# Patient Record
Sex: MALE | Race: BLACK OR AFRICAN AMERICAN | NOT HISPANIC OR LATINO | Employment: STUDENT | URBAN - METROPOLITAN AREA
[De-identification: names, ages, dates, MRNs, and addresses within clinical notes are randomized per-mention and may not be internally consistent; named-entity substitution may affect disease eponyms.]

---
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
## 2021-03-12 ENCOUNTER — TELEPHONE (OUTPATIENT)
Dept: PEDIATRIC CARDIOLOGY | Facility: CLINIC | Age: 21
End: 2021-03-12

## 2021-03-15 ENCOUNTER — HOSPITAL ENCOUNTER (OUTPATIENT)
Dept: PEDIATRIC CARDIOLOGY | Facility: HOSPITAL | Age: 21
Discharge: HOME | End: 2021-03-15
Attending: PEDIATRICS
Payer: COMMERCIAL

## 2021-03-15 DIAGNOSIS — I47.20 VT (VENTRICULAR TACHYCARDIA): ICD-10-CM

## 2021-03-15 DIAGNOSIS — Q21.3 TETRALOGY OF FALLOT: ICD-10-CM

## 2021-03-15 LAB
AV DELAY - LONGEST: 320 MSEC
AV DELAY - SHORTEST: 110 MSEC
CHARGE TIME (SEC): 11 SEC
HV IMPEDANCE (OHM): 60 OHM
IMPEDANCE RA LEAD (NATIVE): 355 OHMS
IMPEDANCE RA LEAD: 830 OHMS
OHS CV DC PP MS1: 0.4 MS
OHS CV DC PP MS2: 0.4 MS
OHS CV DC PP V1: 2 V
OHS CV DC PP V2: 3.5 V
P/R-WAVE RA LEAD (NATIVE): 11.6 MV
P/R-WAVE RA LEAD: 4.7 MV
PV DELAY - LONGEST: 320 MSEC
PV DELAY - SHORTEST: 110 MSEC

## 2021-03-15 PROCEDURE — 93295 CV ICD REMOTE PEDIATRICS (CUPID ONLY): ICD-10-PCS | Mod: ,,, | Performed by: PEDIATRICS

## 2021-03-15 PROCEDURE — 93295 DEV INTERROG REMOTE 1/2/MLT: CPT | Mod: ,,, | Performed by: PEDIATRICS

## 2021-03-15 PROCEDURE — 93296 REM INTERROG EVL PM/IDS: CPT

## 2021-05-24 DIAGNOSIS — I45.10 COMPLETE RIGHT BUNDLE BRANCH BLOCK (RBBB): ICD-10-CM

## 2021-05-24 DIAGNOSIS — I47.20 VT (VENTRICULAR TACHYCARDIA): ICD-10-CM

## 2021-05-24 DIAGNOSIS — I49.3 PVCS (PREMATURE VENTRICULAR CONTRACTIONS): ICD-10-CM

## 2021-05-24 DIAGNOSIS — Z95.810 ICD (IMPLANTABLE CARDIOVERTER-DEFIBRILLATOR) IN PLACE: ICD-10-CM

## 2021-05-24 RX ORDER — METOPROLOL SUCCINATE 50 MG/1
50 TABLET, EXTENDED RELEASE ORAL 2 TIMES DAILY
Qty: 60 TABLET | Refills: 12 | Status: SHIPPED | OUTPATIENT
Start: 2021-05-24 | End: 2022-07-18

## 2021-06-21 ENCOUNTER — HOSPITAL ENCOUNTER (OUTPATIENT)
Dept: PEDIATRIC CARDIOLOGY | Facility: HOSPITAL | Age: 21
Discharge: HOME | End: 2021-06-21
Attending: PEDIATRICS

## 2021-06-21 DIAGNOSIS — Q21.3 TETRALOGY OF FALLOT: ICD-10-CM

## 2021-06-21 DIAGNOSIS — I47.20 VT (VENTRICULAR TACHYCARDIA): ICD-10-CM

## 2021-06-21 PROCEDURE — 93295 DEV INTERROG REMOTE 1/2/MLT: CPT | Mod: ,,, | Performed by: PEDIATRICS

## 2021-06-21 PROCEDURE — 93295 CV ICD REMOTE PEDIATRICS (CUPID ONLY): ICD-10-PCS | Mod: ,,, | Performed by: PEDIATRICS

## 2021-06-21 PROCEDURE — 93296 REM INTERROG EVL PM/IDS: CPT

## 2021-06-23 LAB
AV DELAY - LONGEST: 320 MSEC
AV DELAY - SHORTEST: 110 MSEC
CHARGE TIME (SEC): 10.8 SEC
HV IMPEDANCE (OHM): 62 OHM
IMPEDANCE RA LEAD (NATIVE): 351 OHMS
IMPEDANCE RA LEAD: 776 OHMS
OHS CV DC PP MS1: 0.4 MS
OHS CV DC PP MS2: 0.4 MS
OHS CV DC PP V1: 2 V
OHS CV DC PP V2: 3.5 V
P/R-WAVE RA LEAD (NATIVE): 12.6 MV
P/R-WAVE RA LEAD: 4.9 MV
PV DELAY - LONGEST: 320 MSEC
PV DELAY - SHORTEST: 110 MSEC

## 2021-09-07 ENCOUNTER — HOSPITAL ENCOUNTER (OUTPATIENT)
Dept: PEDIATRIC CARDIOLOGY | Facility: HOSPITAL | Age: 21
Discharge: HOME | End: 2021-09-07
Attending: PEDIATRICS

## 2021-09-07 DIAGNOSIS — I47.20 VT (VENTRICULAR TACHYCARDIA): ICD-10-CM

## 2021-09-07 DIAGNOSIS — Q21.3 TETRALOGY OF FALLOT: ICD-10-CM

## 2021-09-07 PROCEDURE — 93295 CV ICD REMOTE PEDIATRICS (CUPID ONLY): ICD-10-PCS | Mod: ,,, | Performed by: PEDIATRICS

## 2021-09-07 PROCEDURE — 93296 REM INTERROG EVL PM/IDS: CPT

## 2021-09-07 PROCEDURE — 93295 DEV INTERROG REMOTE 1/2/MLT: CPT | Mod: ,,, | Performed by: PEDIATRICS

## 2021-09-08 DIAGNOSIS — I49.3 PVC'S (PREMATURE VENTRICULAR CONTRACTIONS): ICD-10-CM

## 2021-09-08 DIAGNOSIS — I47.20 VT (VENTRICULAR TACHYCARDIA): Primary | ICD-10-CM

## 2021-09-08 DIAGNOSIS — Z95.810 ICD (IMPLANTABLE CARDIOVERTER-DEFIBRILLATOR), DUAL, IN SITU: ICD-10-CM

## 2021-09-08 DIAGNOSIS — Q21.3 TETRALOGY OF FALLOT: ICD-10-CM

## 2021-09-08 DIAGNOSIS — Q24.9 ADULT CONGENITAL HEART DISEASE: ICD-10-CM

## 2021-09-14 LAB
AV DELAY - LONGEST: 320 MSEC
AV DELAY - SHORTEST: 110 MSEC
CHARGE TIME (SEC): 10.8 SEC
HV IMPEDANCE (OHM): 63 OHM
IMPEDANCE RA LEAD (NATIVE): 348 OHMS
IMPEDANCE RA LEAD: 745 OHMS
OHS CV DC PP MS1: 0.4 MS
OHS CV DC PP MS2: 0.4 MS
OHS CV DC PP V1: 2 V
OHS CV DC PP V2: 3.5 V
P/R-WAVE RA LEAD (NATIVE): 11.6 MV
P/R-WAVE RA LEAD: 2 MV
PV DELAY - LONGEST: 320 MSEC
PV DELAY - SHORTEST: 110 MSEC

## 2021-12-13 ENCOUNTER — HOSPITAL ENCOUNTER (OUTPATIENT)
Dept: PEDIATRIC CARDIOLOGY | Facility: HOSPITAL | Age: 21
Discharge: HOME | End: 2021-12-13
Attending: PEDIATRICS

## 2021-12-13 DIAGNOSIS — I49.3 PVC'S (PREMATURE VENTRICULAR CONTRACTIONS): ICD-10-CM

## 2021-12-13 DIAGNOSIS — I47.20 VT (VENTRICULAR TACHYCARDIA): ICD-10-CM

## 2021-12-13 DIAGNOSIS — Q21.3 TETRALOGY OF FALLOT: ICD-10-CM

## 2021-12-13 DIAGNOSIS — Q24.9 ADULT CONGENITAL HEART DISEASE: ICD-10-CM

## 2021-12-13 DIAGNOSIS — Z95.810 ICD (IMPLANTABLE CARDIOVERTER-DEFIBRILLATOR), DUAL, IN SITU: ICD-10-CM

## 2021-12-13 PROCEDURE — 93295 CV ICD REMOTE PEDIATRICS (CUPID ONLY): ICD-10-PCS | Mod: ,,, | Performed by: PEDIATRICS

## 2021-12-13 PROCEDURE — 93295 DEV INTERROG REMOTE 1/2/MLT: CPT | Mod: ,,, | Performed by: PEDIATRICS

## 2021-12-13 PROCEDURE — 93296 REM INTERROG EVL PM/IDS: CPT

## 2021-12-14 LAB
AV DELAY - LONGEST: 320 MSEC
AV DELAY - SHORTEST: 110 MSEC
CHARGE TIME (SEC): 11.1 SEC
HV IMPEDANCE (OHM): 64 OHM
IMPEDANCE RA LEAD (NATIVE): 350 OHMS
IMPEDANCE RA LEAD: 766 OHMS
OHS CV DC PP MS1: 0.4 MS
OHS CV DC PP MS2: 0.4 MS
OHS CV DC PP V1: 2 V
OHS CV DC PP V2: 3.5 V
P/R-WAVE RA LEAD (NATIVE): 10.5 MV
P/R-WAVE RA LEAD: 2.8 MV
PV DELAY - LONGEST: 320 MSEC
PV DELAY - SHORTEST: 110 MSEC

## 2022-03-14 ENCOUNTER — HOSPITAL ENCOUNTER (OUTPATIENT)
Dept: PEDIATRIC CARDIOLOGY | Facility: HOSPITAL | Age: 22
Discharge: HOME | End: 2022-03-14
Attending: PEDIATRICS

## 2022-03-14 DIAGNOSIS — I49.3 PVC'S (PREMATURE VENTRICULAR CONTRACTIONS): ICD-10-CM

## 2022-03-14 DIAGNOSIS — Q21.3 TETRALOGY OF FALLOT: ICD-10-CM

## 2022-03-14 DIAGNOSIS — I47.20 VT (VENTRICULAR TACHYCARDIA): ICD-10-CM

## 2022-03-14 DIAGNOSIS — Q24.9 ADULT CONGENITAL HEART DISEASE: ICD-10-CM

## 2022-03-14 DIAGNOSIS — Z95.810 ICD (IMPLANTABLE CARDIOVERTER-DEFIBRILLATOR), DUAL, IN SITU: ICD-10-CM

## 2022-03-14 PROCEDURE — 93296 REM INTERROG EVL PM/IDS: CPT

## 2022-03-14 PROCEDURE — 93295 DEV INTERROG REMOTE 1/2/MLT: CPT | Mod: ,,, | Performed by: PEDIATRICS

## 2022-03-14 PROCEDURE — 93295 CV ICD REMOTE PEDIATRICS (CUPID ONLY): ICD-10-PCS | Mod: ,,, | Performed by: PEDIATRICS

## 2022-03-17 LAB
AV DELAY - LONGEST: 320 MSEC
AV DELAY - SHORTEST: 110 MSEC
CHARGE TIME (SEC): 11.1 SEC
IMPEDANCE RA LEAD (NATIVE): 346 OHMS
IMPEDANCE RA LEAD: 765 OHMS
OHS CV DC PP MS1: 0.4 MS
OHS CV DC PP MS2: 0.4 MS
OHS CV DC PP V1: 2 V
OHS CV DC PP V2: 3.5 V
P/R-WAVE RA LEAD (NATIVE): 11.4 MV
P/R-WAVE RA LEAD: 5.1 MV
PV DELAY - LONGEST: 320 MSEC
PV DELAY - SHORTEST: 110 MSEC

## 2022-06-13 ENCOUNTER — HOSPITAL ENCOUNTER (OUTPATIENT)
Dept: PEDIATRIC CARDIOLOGY | Facility: HOSPITAL | Age: 22
Discharge: HOME | End: 2022-06-13
Attending: PEDIATRICS
Payer: COMMERCIAL

## 2022-06-13 DIAGNOSIS — Q21.3 TETRALOGY OF FALLOT: ICD-10-CM

## 2022-06-13 DIAGNOSIS — Z95.810 ICD (IMPLANTABLE CARDIOVERTER-DEFIBRILLATOR), DUAL, IN SITU: ICD-10-CM

## 2022-06-13 DIAGNOSIS — I37.0 NONRHEUMATIC PULMONARY VALVE STENOSIS: Primary | ICD-10-CM

## 2022-06-13 DIAGNOSIS — I49.3 PVC'S (PREMATURE VENTRICULAR CONTRACTIONS): ICD-10-CM

## 2022-06-13 DIAGNOSIS — I37.0 NONRHEUMATIC PULMONARY VALVE STENOSIS: ICD-10-CM

## 2022-06-13 DIAGNOSIS — Q24.9 ADULT CONGENITAL HEART DISEASE: ICD-10-CM

## 2022-06-13 DIAGNOSIS — I47.20 VT (VENTRICULAR TACHYCARDIA): ICD-10-CM

## 2022-06-13 DIAGNOSIS — I45.10 COMPLETE RIGHT BUNDLE BRANCH BLOCK: ICD-10-CM

## 2022-06-13 LAB
AV DELAY - LONGEST: 320 MSEC
AV DELAY - SHORTEST: 110 MSEC
CHARGE TIME (SEC): 11.3 SEC
HV IMPEDANCE (OHM): 66 OHM
IMPEDANCE RA LEAD (NATIVE): 358 OHMS
IMPEDANCE RA LEAD: 778 OHMS
OHS CV DC PP MS1: 0.4 MS
OHS CV DC PP MS2: 0.4 MS
OHS CV DC PP V1: 2 V
OHS CV DC PP V2: 3.5 V
P/R-WAVE RA LEAD (NATIVE): 11.6 MV
P/R-WAVE RA LEAD: 4.4 MV
PV DELAY - LONGEST: 320 MSEC
PV DELAY - SHORTEST: 110 MSEC

## 2022-06-13 PROCEDURE — 93295 CV ICD REMOTE PEDIATRICS (CUPID ONLY): ICD-10-PCS | Mod: ,,, | Performed by: PEDIATRICS

## 2022-06-13 PROCEDURE — 93296 REM INTERROG EVL PM/IDS: CPT

## 2022-06-13 PROCEDURE — 93295 DEV INTERROG REMOTE 1/2/MLT: CPT | Mod: ,,, | Performed by: PEDIATRICS

## 2022-07-18 ENCOUNTER — PATIENT MESSAGE (OUTPATIENT)
Dept: PEDIATRIC CARDIOLOGY | Facility: CLINIC | Age: 22
End: 2022-07-18

## 2022-09-12 ENCOUNTER — HOSPITAL ENCOUNTER (OUTPATIENT)
Dept: PEDIATRIC CARDIOLOGY | Facility: HOSPITAL | Age: 22
Discharge: HOME | End: 2022-09-12
Attending: PEDIATRICS
Payer: COMMERCIAL

## 2022-09-12 DIAGNOSIS — Z95.810 ICD (IMPLANTABLE CARDIOVERTER-DEFIBRILLATOR), DUAL, IN SITU: ICD-10-CM

## 2022-09-12 DIAGNOSIS — Q24.9 ADULT CONGENITAL HEART DISEASE: ICD-10-CM

## 2022-09-12 DIAGNOSIS — I47.20 VT (VENTRICULAR TACHYCARDIA): ICD-10-CM

## 2022-09-12 DIAGNOSIS — I37.0 NONRHEUMATIC PULMONARY VALVE STENOSIS: ICD-10-CM

## 2022-09-12 DIAGNOSIS — I45.10 COMPLETE RIGHT BUNDLE BRANCH BLOCK: ICD-10-CM

## 2022-09-12 DIAGNOSIS — I49.3 PVC'S (PREMATURE VENTRICULAR CONTRACTIONS): ICD-10-CM

## 2022-09-12 DIAGNOSIS — Q21.3 TETRALOGY OF FALLOT: ICD-10-CM

## 2022-09-12 PROCEDURE — 93296 REM INTERROG EVL PM/IDS: CPT

## 2022-09-12 PROCEDURE — 93295 CV ICD REMOTE PEDIATRICS (CUPID ONLY): ICD-10-PCS | Mod: ,,, | Performed by: PEDIATRICS

## 2022-09-12 PROCEDURE — 93295 DEV INTERROG REMOTE 1/2/MLT: CPT | Mod: ,,, | Performed by: PEDIATRICS

## 2022-09-25 LAB
AV DELAY - LONGEST: 320 MSEC
AV DELAY - SHORTEST: 110 MSEC
CHARGE TIME (SEC): 11.3 SEC
HV IMPEDANCE (OHM): 61 OHM
IMPEDANCE RA LEAD (NATIVE): 380 OHMS
IMPEDANCE RA LEAD: 752 OHMS
OHS CV DC PP MS1: 0.4 MS
OHS CV DC PP MS2: 0.4 MS
OHS CV DC PP V1: 2 V
OHS CV DC PP V2: 3.5 V
P/R-WAVE RA LEAD (NATIVE): 10.9 MV
P/R-WAVE RA LEAD: 3.8 MV
PV DELAY - LONGEST: 320 MSEC
PV DELAY - SHORTEST: 110 MSEC
THRESHOLD MS RA LEAD (NATIVE): 0.4 MS
THRESHOLD MS RA LEAD: 0.4 MS
THRESHOLD V RA LEAD (NATIVE): 1.8 V
THRESHOLD V RA LEAD: 1 V

## 2022-10-21 ENCOUNTER — TELEPHONE (OUTPATIENT)
Dept: PEDIATRIC CARDIOLOGY | Facility: CLINIC | Age: 22
End: 2022-10-21
Payer: COMMERCIAL

## 2022-10-21 NOTE — TELEPHONE ENCOUNTER
Called Jian back- he said in the past he was on Atenolol but it was switched to Metoprolol. He has been noticing some brain fog and saw that could be a potential side effect. He wanted to discuss either decreasing the dose or switching to something new.     Gave Jian number for [facility] cardiology so Jian can discuss his concerns with his EP team. All questions answered.

## 2022-10-21 NOTE — TELEPHONE ENCOUNTER
Returned patient's call and his phone went straight to  x 3 times. I left a message asking him to call me back when he receives my message so that he can get answers about his medication and schedule appointment in [facility] clinic that he is overdue for.

## 2022-10-21 NOTE — TELEPHONE ENCOUNTER
----- Message from Jeana Moore MA sent at 10/21/2022 11:40 AM CDT -----  Regarding: Change Medication  Patient called and wanted to get medication changed. [phone].

## 2022-12-12 ENCOUNTER — HOSPITAL ENCOUNTER (OUTPATIENT)
Dept: PEDIATRIC CARDIOLOGY | Facility: HOSPITAL | Age: 22
Discharge: HOME | End: 2022-12-12
Attending: PEDIATRICS
Payer: COMMERCIAL

## 2022-12-12 DIAGNOSIS — I45.10 COMPLETE RIGHT BUNDLE BRANCH BLOCK: ICD-10-CM

## 2022-12-12 DIAGNOSIS — Q24.9 ADULT CONGENITAL HEART DISEASE: ICD-10-CM

## 2022-12-12 DIAGNOSIS — I47.20 VT (VENTRICULAR TACHYCARDIA): ICD-10-CM

## 2022-12-12 DIAGNOSIS — Z95.810 ICD (IMPLANTABLE CARDIOVERTER-DEFIBRILLATOR), DUAL, IN SITU: ICD-10-CM

## 2022-12-12 DIAGNOSIS — Q21.3 TETRALOGY OF FALLOT: ICD-10-CM

## 2022-12-12 DIAGNOSIS — I37.0 NONRHEUMATIC PULMONARY VALVE STENOSIS: ICD-10-CM

## 2022-12-12 DIAGNOSIS — I49.3 PVC'S (PREMATURE VENTRICULAR CONTRACTIONS): ICD-10-CM

## 2022-12-12 PROCEDURE — 93296 REM INTERROG EVL PM/IDS: CPT

## 2022-12-12 PROCEDURE — 93295 DEV INTERROG REMOTE 1/2/MLT: CPT | Mod: ,,, | Performed by: PEDIATRICS

## 2022-12-12 PROCEDURE — 93295 CV ICD REMOTE PEDIATRICS (CUPID ONLY): ICD-10-PCS | Mod: ,,, | Performed by: PEDIATRICS

## 2023-02-07 LAB
AV DELAY - SHORTEST: 110 MSEC
CHARGE TIME (SEC): 11.5 SEC
HV IMPEDANCE (OHM): 63 OHM
IMPEDANCE RA LEAD (NATIVE): 358 OHMS
IMPEDANCE RA LEAD: 751 OHMS
OHS CV DC PP MS1: 0.4 MS
OHS CV DC PP MS2: 0.4 MS
OHS CV DC PP V1: 2 V
OHS CV DC PP V2: 3.5 V
P/R-WAVE RA LEAD (NATIVE): 10.9 MV
P/R-WAVE RA LEAD: 6.1 MV
PV DELAY - SHORTEST: 110 MSEC

## 2023-02-13 DIAGNOSIS — I49.3 PVC'S (PREMATURE VENTRICULAR CONTRACTIONS): ICD-10-CM

## 2023-02-13 DIAGNOSIS — I37.0 NONRHEUMATIC PULMONARY VALVE STENOSIS: Primary | ICD-10-CM

## 2023-02-13 DIAGNOSIS — Z95.810 ICD (IMPLANTABLE CARDIOVERTER-DEFIBRILLATOR), DUAL, IN SITU: ICD-10-CM

## 2023-02-13 DIAGNOSIS — I45.10 COMPLETE RIGHT BUNDLE BRANCH BLOCK: ICD-10-CM

## 2023-02-13 DIAGNOSIS — I47.20 VT (VENTRICULAR TACHYCARDIA): ICD-10-CM

## 2023-02-13 DIAGNOSIS — Q21.3 TETRALOGY OF FALLOT: ICD-10-CM

## 2023-03-03 ENCOUNTER — PATIENT MESSAGE (OUTPATIENT)
Dept: PEDIATRIC CARDIOLOGY | Facility: CLINIC | Age: 23
End: 2023-03-03
Payer: COMMERCIAL

## 2023-03-13 ENCOUNTER — HOSPITAL ENCOUNTER (OUTPATIENT)
Dept: PEDIATRIC CARDIOLOGY | Facility: HOSPITAL | Age: 23
Discharge: HOME | End: 2023-03-13
Attending: PEDIATRICS
Payer: COMMERCIAL

## 2023-03-13 DIAGNOSIS — Q21.3 TETRALOGY OF FALLOT: ICD-10-CM

## 2023-03-13 DIAGNOSIS — I47.20 VT (VENTRICULAR TACHYCARDIA): ICD-10-CM

## 2023-03-13 DIAGNOSIS — Q24.9 ADULT CONGENITAL HEART DISEASE: ICD-10-CM

## 2023-03-13 DIAGNOSIS — I45.10 COMPLETE RIGHT BUNDLE BRANCH BLOCK: ICD-10-CM

## 2023-03-13 DIAGNOSIS — I49.3 PVC'S (PREMATURE VENTRICULAR CONTRACTIONS): ICD-10-CM

## 2023-03-13 DIAGNOSIS — Z95.810 ICD (IMPLANTABLE CARDIOVERTER-DEFIBRILLATOR), DUAL, IN SITU: ICD-10-CM

## 2023-03-13 DIAGNOSIS — I37.0 NONRHEUMATIC PULMONARY VALVE STENOSIS: ICD-10-CM

## 2023-03-13 PROCEDURE — 93295 DEV INTERROG REMOTE 1/2/MLT: CPT | Mod: ,,, | Performed by: PEDIATRICS

## 2023-03-13 PROCEDURE — 93295 CV ICD REMOTE PEDIATRICS (CUPID ONLY): ICD-10-PCS | Mod: ,,, | Performed by: PEDIATRICS

## 2023-03-13 PROCEDURE — 93296 REM INTERROG EVL PM/IDS: CPT

## 2023-04-05 ENCOUNTER — CLINICAL SUPPORT (OUTPATIENT)
Dept: PEDIATRIC CARDIOLOGY | Facility: CLINIC | Age: 23
End: 2023-04-05
Attending: PHYSICIAN ASSISTANT
Payer: COMMERCIAL

## 2023-04-05 ENCOUNTER — OFFICE VISIT (OUTPATIENT)
Dept: PEDIATRIC CARDIOLOGY | Facility: CLINIC | Age: 23
End: 2023-04-05
Payer: COMMERCIAL

## 2023-04-05 ENCOUNTER — CLINICAL SUPPORT (OUTPATIENT)
Dept: PEDIATRIC CARDIOLOGY | Facility: CLINIC | Age: 23
End: 2023-04-05
Payer: COMMERCIAL

## 2023-04-05 VITALS
BODY MASS INDEX: 26.51 KG/M2 | SYSTOLIC BLOOD PRESSURE: 110 MMHG | WEIGHT: 206.56 LBS | DIASTOLIC BLOOD PRESSURE: 68 MMHG | OXYGEN SATURATION: 99 % | RESPIRATION RATE: 16 BRPM | HEART RATE: 60 BPM | HEIGHT: 74 IN

## 2023-04-05 DIAGNOSIS — Z95.810 ICD (IMPLANTABLE CARDIOVERTER-DEFIBRILLATOR), DUAL, IN SITU: ICD-10-CM

## 2023-04-05 DIAGNOSIS — I47.20 VT (VENTRICULAR TACHYCARDIA): ICD-10-CM

## 2023-04-05 DIAGNOSIS — I45.10 COMPLETE RIGHT BUNDLE BRANCH BLOCK: ICD-10-CM

## 2023-04-05 DIAGNOSIS — I37.0 NONRHEUMATIC PULMONARY VALVE STENOSIS: ICD-10-CM

## 2023-04-05 DIAGNOSIS — Q21.3 TETRALOGY OF FALLOT: ICD-10-CM

## 2023-04-05 DIAGNOSIS — I49.3 PVC'S (PREMATURE VENTRICULAR CONTRACTIONS): ICD-10-CM

## 2023-04-05 PROCEDURE — 93000 ELECTROCARDIOGRAM COMPLETE: CPT | Mod: S$GLB,,, | Performed by: PEDIATRICS

## 2023-04-05 PROCEDURE — 3074F SYST BP LT 130 MM HG: CPT | Mod: CPTII,S$GLB,, | Performed by: PHYSICIAN ASSISTANT

## 2023-04-05 PROCEDURE — 3078F PR MOST RECENT DIASTOLIC BLOOD PRESSURE < 80 MM HG: ICD-10-PCS | Mod: CPTII,S$GLB,, | Performed by: PHYSICIAN ASSISTANT

## 2023-04-05 PROCEDURE — 3008F BODY MASS INDEX DOCD: CPT | Mod: CPTII,S$GLB,, | Performed by: PHYSICIAN ASSISTANT

## 2023-04-05 PROCEDURE — 93244 CV 3-14 DAY PEDIATRIC HOLTER MONITOR (CUPID ONLY): ICD-10-PCS | Mod: ,,, | Performed by: PEDIATRICS

## 2023-04-05 PROCEDURE — 1159F PR MEDICATION LIST DOCUMENTED IN MEDICAL RECORD: ICD-10-PCS | Mod: CPTII,S$GLB,, | Performed by: PHYSICIAN ASSISTANT

## 2023-04-05 PROCEDURE — 3074F PR MOST RECENT SYSTOLIC BLOOD PRESSURE < 130 MM HG: ICD-10-PCS | Mod: CPTII,S$GLB,, | Performed by: PHYSICIAN ASSISTANT

## 2023-04-05 PROCEDURE — 93242 EXT ECG>48HR<7D RECORDING: CPT | Mod: ,,, | Performed by: PEDIATRICS

## 2023-04-05 PROCEDURE — 1160F RVW MEDS BY RX/DR IN RCRD: CPT | Mod: CPTII,S$GLB,, | Performed by: PHYSICIAN ASSISTANT

## 2023-04-05 PROCEDURE — 99215 PR OFFICE/OUTPT VISIT, EST, LEVL V, 40-54 MIN: ICD-10-PCS | Mod: 25,S$GLB,, | Performed by: PHYSICIAN ASSISTANT

## 2023-04-05 PROCEDURE — 93000 EKG 12-LEAD: ICD-10-PCS | Mod: S$GLB,,, | Performed by: PEDIATRICS

## 2023-04-05 PROCEDURE — 93244 EXT ECG>48HR<7D REV&INTERPJ: CPT | Mod: ,,, | Performed by: PEDIATRICS

## 2023-04-05 PROCEDURE — 3008F PR BODY MASS INDEX (BMI) DOCUMENTED: ICD-10-PCS | Mod: CPTII,S$GLB,, | Performed by: PHYSICIAN ASSISTANT

## 2023-04-05 PROCEDURE — 1159F MED LIST DOCD IN RCRD: CPT | Mod: CPTII,S$GLB,, | Performed by: PHYSICIAN ASSISTANT

## 2023-04-05 PROCEDURE — 3078F DIAST BP <80 MM HG: CPT | Mod: CPTII,S$GLB,, | Performed by: PHYSICIAN ASSISTANT

## 2023-04-05 PROCEDURE — 1160F PR REVIEW ALL MEDS BY PRESCRIBER/CLIN PHARMACIST DOCUMENTED: ICD-10-PCS | Mod: CPTII,S$GLB,, | Performed by: PHYSICIAN ASSISTANT

## 2023-04-05 PROCEDURE — 93242 CV 3-14 DAY PEDIATRIC HOLTER MONITOR (CUPID ONLY): ICD-10-PCS | Mod: ,,, | Performed by: PEDIATRICS

## 2023-04-05 PROCEDURE — 99215 OFFICE O/P EST HI 40 MIN: CPT | Mod: 25,S$GLB,, | Performed by: PHYSICIAN ASSISTANT

## 2023-04-05 NOTE — PATIENT INSTRUCTIONS
Constantine Connelly MD  Pediatric Cardiology  [address] [place] [address]  [phone]    General Guidelines    Name: Jian Leiva                   : 2000    Diagnosis:   1. Nonrheumatic pulmonary valve stenosis    2. VT (ventricular tachycardia)    3. Complete right bundle branch block    4. Tetralogy of Fallot    5. ICD (implantable cardioverter-defibrillator), dual, in situ    6. PVC's (premature ventricular contractions)        PCP: [facility]  PCP Phone Number: [phone]    If you have an emergency or you think you have an emergency, go to the nearest emergency room!     Breathing too fast, doesnt look right, consistently not eating well, your child needs to be checked. These are general indications that your child is not feeling well. This may be caused by anything, a stomach virus, an ear ache or heart disease, so please call [facility]. If [facility] thinks you need to be checked for your heart, they will let us know.     If your child experiences a rapid or very slow heart rate and has the following symptoms, call [facility] or go to the nearest emergency room.   unexplained chest pain   does not look right   feels like they are going to pass out   actually passes out for unexplained reasons   weakness or fatigue   shortness of breath  or breathing fast   consistent poor feeding     If your child experiences a rapid or very slow heart rate that lasts longer than 30 minutes call [facility] or go to the nearest emergency room.     If your child feels like they are going to pass out - have them sit down or lay down immediately. Raise the feet above the head (prop the feet on a chair or the wall) until the feeling passes. Slowly allow the child to sit, then stand. If the feeling returns, lay back down and start over.     It is very important that you notify [facility] first. [facility] or the ER Physician can reach Dr. Constantine Connelly at the office or through [facility] at [phone] as needed.    Call our office ([phone]) one week after ALL tests for results.     PREVENTION OF BACTERIAL ENDOCARDITIS    A COPY OF THIS SHEET MUST BE GIVEN TO ALL OF YOUR DOCTORS OR HEALTH CARE PROVIDERS    You have received this information because you are at an increased risk for developing adverse outcomes from bacterial endocarditis or infective endocarditis.     Patient Name:  Jian Leiva     : 2000   Diagnosis:   1. Nonrheumatic pulmonary valve stenosis    2. VT (ventricular tachycardia)    3. Complete right bundle branch block    4. Tetralogy of Fallot    5. ICD (implantable cardioverter-defibrillator), dual, in situ    6. PVC's (premature ventricular contractions)        As of 2023, Dr. Constantine Connelly, Pediatric Cardiologist recommends that Jian receive COMPLETE USE of antibiotic prophylaxis from bacterial endocarditis because of an existing heart condition.   Complete use antibiotic prophylaxis with dental or surgical procedures is recommended only for patients with cardiac conditions associated with the highest risk of adverse outcomes from endocarditis, includin) Artificial heart valve; 2) A history of endocarditis infection; 3) A cardiac transplantation recipients with cardiac valvular disease; 4) Certain serious congenital heart conditions including a) Unrepaired/incompletely repaired cyanotic heart disease (including shunts & conduits); b) A completely repaired congenital heart defect with prosthetic material or device for the first six months after the procedure; c) Any repaired congenital heart defect with residual defect at the site or adjacent to the site of a prosthetic patch or prosthetic device (which inhibit endothelialization).    Dental procedures for which prophylaxis is recommended in patients with the cardiac conditions are: 1) All dental  procedures that involve manipulation of gingival tissue or the periapical region of teeth or; 2) perforation of the oral mucosa.  Antibiotic prophylaxis is NOT recommended for the following dental procedures or events: routine anesthetic injections through non-infected tissue; taking dental radiographs; placement of removable prosthodontic or orthodontic appliances; adjustment of orthodontic appliances; placement of orthodontic brackets; and shedding of deciduous teeth or bleeding from trauma to the lips or oral mucosa.    Antibiotic Prophylactic Regimens Recommended for Dental Procedures  ---Regimen - Single Dose 30-60 minutes before Procedure---  Situation Agent Adults Children   Oral Amoxicillin 2g 50/mg/kg   Unable to take oral meds Ampicillin   OR  Cefazolin or ceftriaxone 2 g IM or IV1    1 g IM or IV 50 mg/kg IM or IV    50 mg/kg IM or IV   Allergic to Penicillins   or   ampicillin-    Oral regimen Cephalexin 2  OR  Clindamycin  OR  Azithromycin or clarithromycin 2 g    600 mg    500 mg 50 mg/kg    20 mg/kg    15 mg/kg   Allergic to penicillin or ampicillin and unable to take oral medications Cefazolin or ceftriaxone 3  OR  Clindamycin 1 g IM or IV      600 mg IM or IV 50 mg/kg IM or IV      20 mg/kg IM or IV   1IM - intramuscular; IV - intravenous                               2Or other first or second generation oral cephalosporin in equivalent adult or pediatric dosage.  3Cephalosporins should not be used in an individual with a history of anaphylaxis, angioedema or urticaria with penicillin or ampicillin.   Adapted from Prevention of Infective Endocarditis: Guidelines From the American Heart Association, by the Committee on Rheumatic Fever, Endocarditis, and Kawasaki Disease. Circulation, e-published April 19, 2007. Go to www.americanheart.org/presenter for more information.  The practice of giving patients antibiotics prior to a dental procedure is no longer recommended EXCEPT for patients with the  highest risk of adverse outcomes resulting from bacterial endocarditis. We cannot exclude the possibility that an exceedingly small number of cases, if any, of bacterial endocarditis may be prevented by antibiotic prophylaxis prior to a dental procedure.The importance of good oral and dental health and regular visits to the dentist is important for patients at risk for bacterial endocarditis.  Gastrointestinal (GI)/Genitourinary () Procedures: Antibiotic prophylaxis solely to prevent bacterial endocarditis is no longer recommended for patients who undergo a Gl or  tract procedure, including patients with the highest risk of adverse outcomes due to bacterial endocarditis.

## 2023-04-05 NOTE — PROGRESS NOTES
[facility]  Jian Leiva  2000    Jian Leiva is a 22 y.o. male presenting for follow-up of     1.  Tetralogy of Fallot status post complete repair of 5 months of age              - status post multiple catheter based procedures for branch pulmonary artery stenosis              - now status post pulmonary valve replacement with a 27 mm Trifecta valve with branch pulmonary arterioplasty in 2013              - mild to at most moderate pulmonary valve stenosis and likely moderate pulmonary valve insufficiency, last cardiac MRI 2017              - mildly to moderately reduced right ventricular function with likely mild enlargement              - mildly enlarged aortic root without aortic insufficiency  2.  Dual chamber ICD implant 2018 secondary to nonsustained ventricular tachycardia that persisted on high-dose        beta-blockers.    KATJA Villar was noted to have CHD at 3 months of age, diagnosed with TOF with multiple levels of obstruction (infundibular, valvar, and supravalvar pulmonary stenosis), confluent pulmonary arteries, normal PA pressures with balanced ventricular pressures. His cath was interrupted by hypercyanotic spell and he was transferred to [facility] where he underwent complete repair with transannular patch. In 2004, he had bilateral branch pulmonary artery stenting but persistent residual stenosis at the origin of the LPA. He had serial stent dilations in 2007, 2009, 2011, and 2013 bilaterally. Free PI was noted on 2009 cath; that and RV size were monitored over time until he was submitted to pulmonary valve replacement in 2013 using 27 Trifecta bovine pericardium. He had PA branch arterioplasty using bovine pericardium during that operation. 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 Most recent Cardiac MRI 2017.     He had 7 beat run of slow VT on holter (6/4/18) and started on Atenolol. He continued having ventricular ectopy on event  recorder, including 5 beat run VT at rate of 169bpm (6/21/18) and 6 beat run of VT at rate 165bpm on 7/1/18. He was seen by Dr. Isbell for EP evaluation on 7/2/18 where options including EP study vs ICD implant for primary prevention was discussed.  he underwent dual chamber ICD implant 2018 secondary to nonsustained ventricular tachycardia that persisted on high-dose beta-blockers.     He was last seen by Dr. Connelly 8/20/18. He was then seen by Dr. Gibson 8/24/2020. The plan was to continue current meds and  RTC 1 year. Planned to MRI if device was MRI compatible, CPX, and holter. After visit, Dr. Gibson spoke to EP. His ICD is MRI conditional. MRI can only be 1.5 Rosanna. Device has to be programmed before and after procedure. May have to get rep involved if on a day if EP team not available.  He saw Dr. Isbell 12/30/20 followed shocks from his ICD during intercourse. Upon review of the rhythm on his device, it is suspicious for SVT with a 1:1 AV relationship.  The night he got shocked, he had missed his PM dose of metoprolol.  They discussed ablation with him which to try to eliminate any abnormal rhythms especially SVT that would put him at risk for shocks. He was going to discuss with his mom and call back. He is late for follow up.     Jian has been doing well since last visit.he states when he is laying down/relaxing and his HR is low he can feel pacing and would like to change the lower rate which is set at 60 bpm.  Jian has a lot of energy and does not get short of breath with activity. Denies any recent illness, surgeries, or hospitalizations.    There are no reports of chest pain, chest pain with exertion, cyanosis, exercise intolerance, dyspnea, fatigue, palpitations, syncope, and tachypnea. No other cardiovascular or medical concerns are reported.      Medications:   Medication List with Changes/Refills   Current Medications    ASPIRIN 81 MG CHEW    Take 81 mg by mouth once daily.    FLUTICASONE  PROPIONATE (FLONASE) 50 MCG/ACTUATION NASAL SPRAY    1 spray by Nasal route daily as needed.    METOPROLOL SUCCINATE (TOPROL-XL) 50 MG 24 HR TABLET    TAKE 1 TABLET(50 MG) BY MOUTH TWICE DAILY      Allergies:   Review of patient's allergies indicates:   Allergen Reactions    Kaopectate (attapulgite)      Family History   Problem Relation Age of Onset    Hypertension Mother     No Known Problems Father     No Known Problems Sister     No Known Problems Brother     No Known Problems Brother     No Known Problems Sister     No Known Problems Sister     No Known Problems Maternal Grandfather     Congenital heart disease Neg Hx     Early death Neg Hx     Hyperlipidemia Neg Hx     Heart attacks under age 50 Neg Hx     Arrhythmia Neg Hx      Past Medical History:   Diagnosis Date    Complete right bundle branch block     ICD (implantable cardioverter-defibrillator) in place     PDA (patent ductus arteriosus)     Pulmonary artery stenosis, branch, central     Pulmonary artery stenosis of peripheral branch at or beyond the hilar bifurcation     Pulmonary insufficiency     Pulmonary stenosis     Pulmonary valve replaced     PVC (premature ventricular contraction)     Right pulmonary artery stenosis     TOF (tetralogy of Fallot)     VT (ventricular tachycardia) 06/2018    Holter      Social History     Social History Narrative    Jian live with parents. He works at [employer] and goes to school for [employer]. No smoke exposure.      Past Surgical History:   Procedure Laterality Date    CARDIAC CATHETERIZATION  2001    [facility]: severe tetralogy of FAllot with multiple levels of obstruction (infundibular, valvular, supravalvular), confluent pulmonary arteries, normal PA pressures with balance ventricular pressures. The cath was not able to be completed, however, due to hypercyanotic spell toward the end of the procedure, he was transferred to [facility]    CARDIAC CATHETERIZATION  1/2/2004    [facility]:TOF s/p  "repair;Branch pulmonary arteries- right ventricular pressures pre-stent placement are 73% of systemic and post-stent placement are 41% of systemic. RPA stent inflated to 12mm in diameter with no residual stenosis. Left pulmonary artery stent inflated to 15mm, however, has a persistent residual stenosis at the origin of the left pulmonary artery    CARDIAC CATHETERIZATION  5/29/2007    [facility]:TOF, repaired;Bilateral branch PA stenosis with large "biliary stents";RPA dilated from 16mm to 18mm; LPA dilated from 6mm to 13mm; no residual branch PA gradients;MInimal RV outflow gradient, 10-12mmHg;Mild RV enlargement, normal RV function;No shunts;No aortopulmonary collaterals;Closing RVp/LVp = 0.4;Mild tricuspid valve insufficiency    CARDIAC CATHETERIZATION  10/30/2009    [facility]:Repaired tetralogy of Fallot (transannular patch technique with bilateral branch pulmonary stenosis);Mildly under expanded stents, re-expanded to 16mm bilaterally. Post-dilation gradient 6-9mmHg as a result of free pulmonary valve insufficiency;Free pulmonary valve insufficiency, mild RVE;Normal right and left ventricular function;No shunts identified;Left aorta;Normal Laura;No aortopulmo     CARDIAC CATHETERIZATION  7/19/2011    [facility]:TOF;Right and left branch pulmonary artery stenosis;s/p stent placement of right and left branch pulmonary arteries, P308;s/p redilation of right and left PA branches and stents with 12mm balloons. Gradient across branch PAs was roughly 20-25mmHg before and 15-20mmHg after. RV pressure remains about 50% systemic;     CARDIAC CATHETERIZATION  8/13/2012    [facility]:TOF;Right and left branch pulmonary stenosis;Status post stent placement in the right and left branch pulmonary arteries;Status post re-dilation of stent 2007 & 2011. Due to PA positioning (branch PA stents allocated adjacent to each other), stent dilation cannot be performed. Needs augmentation of branch vessels surgically, but possibly valve " replacement to be scheduduled at same time    CARDIAC DEFIBRILLATOR PLACEMENT  07/18/2018    [facility]: ICD DYNAGEN EL DR D152: Serial No. [id number]; LEAD RELIANCE DF4 0293 64CM: Serial No. [id number]; LEAD INGEVITY IS-1 MRI 7741 52CM: Serial No. [id number];     CARDIAC MRI  3/30/2010    [facility]:Normal LV volume with LVEF 40%. Global hypokinesis which may be secondary to anesthesia;Increased RV volume with RVEF 27%;Unrestricted PI with regurgitation;Good pulmonary blood flow by MRA with gadolinium    CARDIAC MRI  11/25/2014    [facility]:Normal left ventricular volumes with LVEF 55%;Increased right ventricular volumes with RVEF 39%;QUINTIN;Pulmonary prosthetic valve with mild PI and peak velocity of 3.7m/sec;RVEDV: 143 cc/m2 for BSA    CARDIAC MRI  12/08/2015    [facility]: RVEDV 138cc/m2; PV 3m/s; mild PI; prox RPA stenosis    CARDIAC SURGERY  2001    [facility]: Complete repair of tetralogy of Fallot with transannular patch;Ligation of PDA    CARDIAC SURGERY  6/14/2013    [facility]:Pulmonary valve replacement using 27mm Trifecta bovine pericardial valve;Bilateral patch pulmonary arterioplasty using bovine pericardium. Incisions made into the left and right branch PAs, anterior aspects of the stents were resected, and PA branches were patched with bovine pericardial patch with running suture     Birth History    Birth     Weight: 3.062 kg (6 lb 12 oz)    Gestation Age: 39 wks       There is no immunization history on file for this patient.  Immunizations were reviewed today and if not current, recommend follow up with the PCP for further management.  Past medical history, family history, surgical history, social history updated and reviewed today.     Review of Systems  GENERAL: No fever, chills, fatigability, malaise, or weight loss.  CHEST: Denies ELL[place], cyanosis, wheezing, cough, sputum production, or SOB.  CARDIOVASCULAR: Denies chest pain, palpitations, diaphoresis, SOB, or reduced exercise tolerance.  Endocrine: Denies polyphagia, polydipsia,  "or polyuria  Skin: Denies rashes or color change  HENT: Negative for congestion, headaches and sore throat.   ABDOMEN: Appetite fine. No weight loss. Denies diarrhea, abdominal pain, nausea, or vomiting.  PERIPHERAL VASCULAR: No edema, varicosities, or cyanosis.  Musculoskeletal: Negative for muscle weakness and stiffness.  NEUROLOGIC: no dizziness, no history of syncope by report, no headache   Psychiatric/Behavioral: Negative for altered mental status. The patient is not nervous/anxious.   Allergic/Immunologic: Negative for environmental allergies.   : dysuria, hematuria, polyuria    Objective:   /68 (BP Location: Right arm, Patient Position: Sitting, BP Method: X-Large (Manual))   Pulse 60   Resp 16   Ht 6' 1.62" (1.87 m)   Wt 93.7 kg (206 lb 9.1 oz)   SpO2 99%   BMI 26.80 kg/m²   Body surface area is 2.21 meters squared.  Growth percentile SmartLinks can only be used for patients less than 20 years old.    Physical Exam  GENERAL: Awake, well-developed well-nourished, no apparent distress  HEENT: mucous membranes moist and pink, normocephalic, no cranial or carotid bruits, sclera anicteric  NECK:  no lymphadenopathy  CHEST: Good air movement, clear to auscultation bilaterally,   CARDIOVASCULAR: slight left parasternal lift. regular rate and rhythm, single S1, split S2, normal P2, No S3 or S4, no rubs or gallops. No clicks or rumbles. No cardiomegaly by palpation.Grade 2-3/6 WISAM at the ULSB. Grade 2/6 PI murmur at Erbs.   ABDOMEN: Soft, nontender nondistended, no hepatosplenomegaly, no aortic bruits  EXTREMITIES: Warm well perfused, 2+ radial/pedal/femoral pulses, capillary refill 2 seconds, no clubbing, cyanosis, or edema  NEURO: Alert and oriented, cooperative with exam, face symmetric, moves all extremities well.  Skin: pink, turgor WNL, well healed sternotomy.   Vitals reviewed     Tests:   Today's EKG interpretation by Dr. Connelly reveals:   NSR   CRBBB  Anterior left hemiblock   (Final report in " electronic medical record)      Echocardiogram:   Pertinent echocardiographic findings from the echo dated 4/5/23 are:   S/P Repair Tetralogy of Fallot (transannular patch), Branch PA stenosis - S/P bilateral stents. Pulmonary valve replacement with 27 mm Trifecta bovine pericardial valve and bilateral patch pulmonary arterioplasty, June 2013. There are 4 chambers with normally aligned great vessels. There are no shunts noted. No RVOTO noted Moderate stenosis across the bioprosthetic pulmonary valve PG 47(28), 52 (28) mmHg. Free pulmonary insufficiency. Dyskinetic ventricular septum, but good LVPW motion on PLAX virw? LV apical view not as good Mild diastolic dysfunction** Pacemaker lead seen crossing the TV and inserting into the RV with only trivial tricuspid insufficiency. IVSDd 1.14 cm RVSP 42 mmHg with mildly reduced function Mild RA & RV dilation RVIDd 3.5 cm with mild hypertrophy LVIDd 5.2 cm AV PG 21(10) mmHg from LVOT? From Asc. Ao PG 2 mmHg No VSD shunt noted TAPSE 2.0 cm LV lateral tissue doppler mildly decreased LA Volume 17 ml/m2 Unable to visualize branch PA's. Coronaries not visualized on today's exam. Clinical Correlation Suggested Complete IE D. aorta PG 5 mmHg Consider cardiac MRI Follow Up Warranted with Dr ROHAN Gibson  (Full report in electronic medical record)       A cardiac MRI performed 2017 revealed:  Conclusion:     1. Mildly increased right ventricular volumes. (RVEDV 120cc/m2 for BSA, RVESV 73 cc/m2 for BSA).  RVEF 39%.   2. Increase LV systolic left ventricular volume with LVEF 48 %.   3. Bioprosthetic pulmonary valve with a peak systolic velocity of 3.8 m/sec and PI regurgitation fraction of 10% and regurgitation volume of 9 cc consistent with mild PI   4. Distal PAs appear patent distally.   5. Compared to prior MRI in 2015,the pulmonary valve systolic velocity has increased.       3/15/2023 ICD transmission  General comments: REMOTE transmission received and data reviewed. Device and  leads WNL. Battery longevity Atrial paced 24 % Ventricular paced 7 % 13 ATR-ECGs reveal intermittent FF over sensing 1 NSVT- 190 bpm, likely ST with FF oversensing on A-lead 1 Monitored VT- 181 bpm, likely ST vs SVT with FF oversensing on A-lead Next transmission scheduled for Monday, June 12, 2023 Nurses's comments: Increase Atrial sensitivity from 1.0 mV ----> 2.5mV given P-wave 5mV to prevent FF oversensing    General comments:   REMOTE transmission received and data reviewed.  Device and leads WNL.   Battery longevity 9.5 years.  AHR x 10 - No EGMs available ( All on 10 December 2022.)  VT-NS x3  episodes on 3 April 2022. No therapy delivered. No EGMs available.     Assessment:  Patient Active Problem List   Diagnosis    Pulmonary stenosis, valvar    Pulmonary artery stenosis of peripheral branch at or beyond the hilar bifurcation    Nonrheumatic pulmonary valve insufficiency    Complete right bundle branch block    S/P pulmonary valve replacement    VT (ventricular tachycardia)    PVC's (premature ventricular contractions)    Tetralogy of Fallot    ICD (implantable cardioverter-defibrillator), dual, in situ    Adult congenital heart disease       Discussion/ Plan:   Dr. Connelly reviewed history and physical exam. He then performed the physical exam. He discussed the findings with the patient's caregiver(s), and answered all questions. Dr. Connelly and I have reviewed our general guidelines related to cardiac issues with the family.  I instructed them in the event of an emergency to call 911 or go to the nearest emergency room.  They know to contact the PCP if problems arise or if they are in doubt.    Jian is followed for Tetralogy of Fallot s/p complete repair of 5 months of age, multiple catheter based procedures for branch pulmonary artery stenosis, pulmonary valve replacement with a 27 mm Trifecta valve with branch pulmonary arterioplasty in 2013, dual chamber ICD implant 2018 secondary to nonsustained  ventricular tachycardia that persisted on high-dose beta-blockers. Echo today: moderate pulmonary valve stenosis, Free PI, RVID 3.5 cm, RVSP 42 mmHg, mild diastolic dysfunction.  EP last saw him and discussed ablation with him which to try to eliminate any abnormal rhythms especially SVT that would put him at risk for shocks which he was going to think about.  He states when he is laying down/relaxing and his HR is low he can feel pacing and would like to change the lower rate which is set at 60 bpm. Dr. Connelly would like to do a holter today and have him follow up with EP.  He was then seen by Dr. Gibson 8/24/2020. The plan was to continue current meds and  RTC 1 year. Planned to MRI if device was MRI compatible, CPX, and holter. After visit, Dr. Gibson spoke to EP. His ICD is MRI conditional. MRI can only be 1.5 Rosanna. Device has to be programmed before and after procedure.  Dr. Connelly will talk to Dr. Gibson about having him go to [facility] for  testing, seeing Dr. Gibson and EP. He will return to Dr. Connelly as needed since he will be followed by Dr. Gibson in the adult congenital clinic as well as by EP.    I spent a total of 40 minutes on the day of the visit.  This includes face to face time and non-face to face time preparing to see the patient (eg, review of tests), obtaining and/or reviewing separately obtained history, documenting clinical information in the electronic or other health record, independently interpreting results and communicating results to the patient/family/caregiver, or care coordinator.     Activity:Avoid activities that may cause traumatic injury to pacemaker leads/generator. He should be allowed to set .his own pace and rest if fatigued.     Complete endocarditis prophylaxis is recommended in this circumstance.      Medications:   Medication List with Changes/Refills   Current Medications    ASPIRIN 81 MG CHEW    Take 81 mg by mouth once daily.    FLUTICASONE PROPIONATE (FLONASE) 50  MCG/ACTUATION NASAL SPRAY    1 spray by Nasal route daily as needed.    METOPROLOL SUCCINATE (TOPROL-XL) 50 MG 24 HR TABLET    TAKE 1 TABLET(50 MG) BY MOUTH TWICE DAILY         Orders placed this encounter  No orders of the defined types were placed in this encounter.      Follow-Up:   He will return to Dr. Connelly as needed since he will be followed by Dr. Gibson in the adult congenital clinic as well as by EP.     Sincerely,  Constantine Connelly MD    Note Contributing Authors:  MD Aliya Valencia PA-C  04/05/2023    Attestation: Constantine Connelly MD  I have reviewed the records and agree with the above. I have examined the patient and discussed the findings with the family in attendance. All questions were answered to their satisfaction. I agree with the plan and the follow up instructions.

## 2023-04-10 ENCOUNTER — HOSPITAL ENCOUNTER (OUTPATIENT)
Dept: PEDIATRIC CARDIOLOGY | Facility: HOSPITAL | Age: 23
Discharge: HOME | End: 2023-04-10
Attending: PEDIATRICS
Payer: COMMERCIAL

## 2023-04-10 DIAGNOSIS — Q21.3 TETRALOGY OF FALLOT: ICD-10-CM

## 2023-04-10 DIAGNOSIS — I47.20 VT (VENTRICULAR TACHYCARDIA): ICD-10-CM

## 2023-04-10 DIAGNOSIS — I45.10 COMPLETE RIGHT BUNDLE BRANCH BLOCK: ICD-10-CM

## 2023-04-10 DIAGNOSIS — I47.20 VT (VENTRICULAR TACHYCARDIA): Primary | ICD-10-CM

## 2023-04-10 DIAGNOSIS — I49.3 PVC'S (PREMATURE VENTRICULAR CONTRACTIONS): ICD-10-CM

## 2023-04-10 DIAGNOSIS — Q24.9 ADULT CONGENITAL HEART DISEASE: ICD-10-CM

## 2023-04-10 PROCEDURE — 93296 REM INTERROG EVL PM/IDS: CPT

## 2023-04-21 LAB
OHS CV EVENT MONITOR DAY: 2
OHS CV HOLTER HOOKUP DATE: NORMAL
OHS CV HOLTER HOOKUP TIME: NORMAL
OHS CV HOLTER LENGTH DECIMAL HOURS: 70
OHS CV HOLTER LENGTH HOURS: 22
OHS CV HOLTER LENGTH MINUTES: 0
OHS CV HOLTER SCAN DATE: NORMAL
OHS CV HOLTER SINUS AVERAGE HR: 68 BPM
OHS CV HOLTER SINUS MAX HR: 119 BPM
OHS CV HOLTER SINUS MIN HR: 59 BPM
OHS CV HOLTER STUDY END DATE: NORMAL
OHS CV HOLTER STUDY END TIME: NORMAL

## 2023-04-26 ENCOUNTER — TELEPHONE (OUTPATIENT)
Dept: PEDIATRIC CARDIOLOGY | Facility: CLINIC | Age: 23
End: 2023-04-26
Payer: COMMERCIAL

## 2023-04-26 NOTE — TELEPHONE ENCOUNTER
Dr. Connelly reviewed his echo and holter with chart. He called Dr. Gibson. Dr. Gibson will arrange for patient to follow up with him with appropriate  testing.  Also sent message to Dr. Mckeon's team about follow up.  Spoke to Jian and updated him with plan.

## 2023-06-02 LAB
AV DELAY - LONGEST: 320 MSEC
AV DELAY - LONGEST: 320 MSEC
AV DELAY - SHORTEST: 110 MSEC
AV DELAY - SHORTEST: 110 MSEC
CHARGE TIME (SEC): 11.5 SEC
CHARGE TIME (SEC): 11.5 SEC
HV IMPEDANCE (OHM): 60 OHM
HV IMPEDANCE (OHM): 62 OHM
IMPEDANCE RA LEAD (NATIVE): 352 OHMS
IMPEDANCE RA LEAD (NATIVE): 355 OHMS
IMPEDANCE RA LEAD: 671 OHMS
IMPEDANCE RA LEAD: 734 OHMS
OHS CV DC PP MS1: 0.4 MS
OHS CV DC PP MS1: 0.4 MS
OHS CV DC PP MS2: 0.4 MS
OHS CV DC PP MS2: 0.4 MS
OHS CV DC PP V1: 2 V
OHS CV DC PP V1: 2 V
OHS CV DC PP V2: 3.5 V
OHS CV DC PP V2: 3.5 V
P/R-WAVE RA LEAD (NATIVE): 10.8 MV
P/R-WAVE RA LEAD (NATIVE): 11 MV
P/R-WAVE RA LEAD: 4.2 MV
P/R-WAVE RA LEAD: 5.1 MV
PV DELAY - LONGEST: 320 MSEC
PV DELAY - LONGEST: 320 MSEC
PV DELAY - SHORTEST: 110 MSEC
PV DELAY - SHORTEST: 110 MSEC

## 2023-06-05 ENCOUNTER — TELEPHONE (OUTPATIENT)
Dept: PEDIATRIC CARDIOLOGY | Facility: CLINIC | Age: 23
End: 2023-06-05
Payer: COMMERCIAL

## 2023-06-05 DIAGNOSIS — I47.20 VT (VENTRICULAR TACHYCARDIA): Primary | ICD-10-CM

## 2023-06-05 DIAGNOSIS — Z95.810 ICD (IMPLANTABLE CARDIOVERTER-DEFIBRILLATOR), DUAL, IN SITU: ICD-10-CM

## 2023-06-05 DIAGNOSIS — Q21.3 TETRALOGY OF FALLOT: ICD-10-CM

## 2023-06-05 DIAGNOSIS — I45.10 COMPLETE RIGHT BUNDLE BRANCH BLOCK: ICD-10-CM

## 2023-06-05 DIAGNOSIS — Q24.9 ADULT CONGENITAL HEART DISEASE: ICD-10-CM

## 2023-06-05 DIAGNOSIS — Z95.2 S/P PULMONARY VALVE REPLACEMENT: ICD-10-CM

## 2023-06-05 NOTE — TELEPHONE ENCOUNTER
Spoke with patient to schedule clinic visits with Dr Mckeon & Dr Gibson and CPX. Patient accepted 8/17 starting at 9 with CPX. Verified mailing address, will send appointment slips. Advised to wear running shoes and comfortable clothing for CPX and gave NPO instructions for test.

## 2023-07-07 ENCOUNTER — PATIENT MESSAGE (OUTPATIENT)
Dept: PEDIATRIC CARDIOLOGY | Facility: CLINIC | Age: 23
End: 2023-07-07
Payer: COMMERCIAL

## 2023-07-10 ENCOUNTER — TELEPHONE (OUTPATIENT)
Dept: PEDIATRIC CARDIOLOGY | Facility: CLINIC | Age: 23
End: 2023-07-10
Payer: COMMERCIAL

## 2023-07-10 ENCOUNTER — HOSPITAL ENCOUNTER (OUTPATIENT)
Dept: PEDIATRIC CARDIOLOGY | Facility: HOSPITAL | Age: 23
Discharge: HOME | End: 2023-07-10
Attending: PEDIATRICS
Payer: COMMERCIAL

## 2023-07-10 DIAGNOSIS — I49.3 PVC'S (PREMATURE VENTRICULAR CONTRACTIONS): ICD-10-CM

## 2023-07-10 DIAGNOSIS — I47.20 VT (VENTRICULAR TACHYCARDIA): ICD-10-CM

## 2023-07-10 DIAGNOSIS — I45.10 COMPLETE RIGHT BUNDLE BRANCH BLOCK: ICD-10-CM

## 2023-07-10 DIAGNOSIS — Q24.9 ADULT CONGENITAL HEART DISEASE: ICD-10-CM

## 2023-07-10 DIAGNOSIS — Q21.3 TETRALOGY OF FALLOT: ICD-10-CM

## 2023-07-10 PROCEDURE — 93295 DEV INTERROG REMOTE 1/2/MLT: CPT | Mod: ,,, | Performed by: PEDIATRICS

## 2023-07-10 PROCEDURE — 93296 REM INTERROG EVL PM/IDS: CPT

## 2023-07-10 PROCEDURE — 93295 CV ICD REMOTE PEDIATRICS (CUPID ONLY): ICD-10-PCS | Mod: ,,, | Performed by: PEDIATRICS

## 2023-07-10 NOTE — TELEPHONE ENCOUNTER
Spoke with Jian. Requested that he do REMOTE transmission from his ICD. States he will go do it now.   Body Location Override (Optional - Billing Will Still Be Based On Selected Body Map Location If Applicable): left alar crease Render Path Notes In Note?: No Hemostasis: Drysol Biopsy Method: Dermablade Billing Type: Third-Party Bill Dressing: bandage Was A Bandage Applied: Yes Silver Nitrate Text: The wound bed was treated with silver nitrate after the biopsy was performed. Post-Care Instructions: I reviewed with the patient in detail post-care instructions. Patient is to keep the biopsy site dry overnight, and then apply bacitracin twice daily until healed. Patient may apply hydrogen peroxide soaks to remove any crusting. Detail Level: Detailed Cryotherapy Text: The wound bed was treated with cryotherapy after the biopsy was performed. Curettage Text: The wound bed was treated with curettage after the biopsy was performed. Lab: 6 Wound Care: Petrolatum Size Of Lesion In Cm: 0.5 Electrodesiccation Text: The wound bed was treated with electrodesiccation after the biopsy was performed. Depth Of Biopsy: dermis Lab Facility: 3 Anesthesia Type: 1% lidocaine with epinephrine Additional Anesthesia Volume In Cc (Will Not Render If 0): 0 Notification Instructions: Patient will be notified of biopsy results. However, patient instructed to call the office if not contacted within 2 weeks. Electrodesiccation And Curettage Text: The wound bed was treated with electrodesiccation and curettage after the biopsy was performed. Consent: Written consent was obtained and risks were reviewed including but not limited to scarring, infection, bleeding, scabbing, incomplete removal, nerve damage and allergy to anesthesia. Biopsy Type: H and E Type Of Destruction Used: Curettage

## 2023-07-19 DIAGNOSIS — I47.20 VT (VENTRICULAR TACHYCARDIA): ICD-10-CM

## 2023-07-19 DIAGNOSIS — Z95.810 ICD (IMPLANTABLE CARDIOVERTER-DEFIBRILLATOR) IN PLACE: ICD-10-CM

## 2023-07-19 DIAGNOSIS — I45.10 COMPLETE RIGHT BUNDLE BRANCH BLOCK (RBBB): ICD-10-CM

## 2023-07-19 DIAGNOSIS — I49.3 PVCS (PREMATURE VENTRICULAR CONTRACTIONS): ICD-10-CM

## 2023-07-19 RX ORDER — METOPROLOL SUCCINATE 50 MG/1
TABLET, EXTENDED RELEASE ORAL
Qty: 60 TABLET | Refills: 2 | Status: SHIPPED | OUTPATIENT
Start: 2023-07-19

## 2023-07-21 LAB
AV DELAY - LONGEST: 320 MSEC
AV DELAY - SHORTEST: 110 MSEC
CHARGE TIME (SEC): 11.7 SEC
HV IMPEDANCE (OHM): 58 OHM
IMPEDANCE RA LEAD (NATIVE): 349 OHMS
IMPEDANCE RA LEAD: 716 OHMS
OHS CV DC PP MS1: 0.4 MS
OHS CV DC PP MS2: 0.4 MS
OHS CV DC PP V1: 2 V
OHS CV DC PP V2: 3.5 V
P/R-WAVE RA LEAD (NATIVE): 8.8 MV
P/R-WAVE RA LEAD: 4.6 MV
PV DELAY - LONGEST: 320 MSEC
PV DELAY - SHORTEST: 110 MSEC

## 2023-08-16 NOTE — PROGRESS NOTES
Name: Jian Leiva  MRN: [id number]  : 2000      Subjective:   CC: ToF, VT, ICD    HPI:    Jian Leiva is a 23 y.o. male with Tetralogy of Fallot s/p intracardiac repair; s/p Trifecta pulmonary valve replacement; Ventricular tachycardia s/p ICD; who presents to [facility] at [facility] for follow-up.   He denies any symptoms related to the cardiovascular system.  He denies chest pain, shortness of breath, dyspnea on exertion, cyanosis, edema, palpitations, syncope, and near-syncope. 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  He works at [employer] in the [place] section.  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  He lives with his mother    Past-Medical Hx/Problem List:  Tetralogy of Fallot   S/P complete repair of 5 months of age  S/P multiple catheter based procedures for branch pulmonary artery stenosis  S/P pulmonary valve replacement with a 27 mm Trifecta valve with branch pulmonary arterioplasty in 2013  Mild to at most moderate pulmonary valve stenosis and likely moderate pulmonary valve insufficiency,   last cardiac MRI 2017  Mildly to moderately reduced right ventricular function with likely mild enlargement  Mildly enlarged aortic root without aortic insufficiency  Ventricular Tachycardia  S/P dual chamber ICD implant 2018 secondary to nonsustained ventricular tachycardia that persisted on high-dose beta-blockers     Family Hx:  Family History   Problem Relation Age of Onset    Hypertension Mother     No Known Problems Father     No Known Problems Sister     No Known Problems Brother     No Known Problems Brother     No Known Problems Sister     No Known Problems Sister     No Known Problems Maternal Grandfather     Congenital heart disease Neg Hx     Early death Neg Hx     Hyperlipidemia Neg Hx     Heart attacks under age 50 Neg Hx     Arrhythmia Neg Hx        Social Hx:  [place]    Review of Systems:  GEN:  No fevers, No fatigue, No weight-loss, No weight-gain  EYE:  No significant changes in vision, No eye redness, No lens dislocation  ENT: No cough, No congestion, No swelling, No snoring, No hearing loss,   RESP: No increased work of breathing, No dyspnea, No noisy breathing, No hx of pneumothorax  CV:  No chest pain, No palpitations, No tachycardia, No activity or exercise intolerance  GI:  No abdominal pain, No nausea, No vomiting, No diarrhea, No constipation  LESLYE: Normal UOP  MSK: No pain, No swelling, No joint dislocations, No scoliosis, No extremity swelling  HEME: No easy bruising or bleeding  NEUR: No history of seizures, No dizziness, No near-syncope, No syncope  DERM: No Rashes  PSY: No anxiety, No depression  ALL: See below.    Medications & Allergy:  Current Outpatient Medications on File Prior to Visit   Medication Sig Dispense Refill    aspirin 81 MG Chew Take 81 mg by mouth once daily.      fluticasone propionate (FLONASE) 50 mcg/actuation nasal spray 1 spray by Nasal route daily as needed.      metoprolol succinate (TOPROL-XL) 50 MG 24 hr tablet TAKE 1 TABLET(50 MG) BY MOUTH TWICE DAILY 60 tablet 2     No current facility-administered medications on file prior to visit.       Review of patient's allergies indicates:   Allergen Reactions    Kaopectate (attapulgite)           Objective:   Vitals:  Vitals:    08/17/23 1014   BP: 120/67   BP Location: Left arm   Patient Position: Sitting   Pulse: 88   SpO2: 97%   Weight: 74.9 kg (165 lb 2 oz)   Height: 6' 3.04" (1.906 m)     Body mass index is 20.62 kg/m².  Body surface area is 1.99 meters squared.    Exam:  GEN: No acute distress, Normal appearing  EYE: Anicteric sclerae  ENT: No drainage, Moist mucous membranes  PULM: Normal work of breathing;  Clear to auscultation bilaterally, Good air movement throughout.  CV: No chest pain;   II/VI systolic and diastolic murmur;   No rubs or gallops;  EXT: No cyanosis, No " edema   2+ radial and dorsalis pedis pulses bilaterally  ABD: Soft, Non-distended, Non-tender,    No hepatomegaly;  Normal bowel sounds  DERM: No rashes  NEUR: Normal gait, Grossly normal tone.  PSY: Normal mood and affect      Results / Data:   ECG:   (08/17/2023) - Atrial-paced rhythm with 1st-deg AVB, RBBB, QRSD 192ms.  (04/05/2023) - Atrial-paced rhythm with 1st-deg AVB, RBBB.    Holter/Zio:   (04/05/2023)  Sinus rhythm with intraventricular conduction delay predominates.  Occasional atrial and ventricular pacing noted.  Normal HR range.  Patient-triggered events (2) correlate to sinus rhythm.  No significant ectopy burden.    Echocardiogram:   (04/05/2023)  S/P Repair Tetralogy of Fallot (transannular patch), Branch PA stenosis - S/P bilateral stents. Pulmonary valve replacement with 27 mm Trifecta bovine pericardial valve and bilateral patch pulmonary arterioplasty, June 2013. There are 4 chambers with normally aligned great vessels. There are no shunts noted. No RVOTO noted Moderate stenosis across the bioprosthetic pulmonary valve PG 47(28), 52 (28) mmHg. Free pulmonary insufficiency. Dyskinetic ventricular septum, but good LVPW motion on PLAX virw? LV apical view not as good Mild diastolic dysfunction** Pacemaker lead seen crossing the TV and inserting into the RV with only trivial tricuspid insufficiency. IVSDd 1.14 cm RVSP 42 mmHg with mildly reduced function Mild RA & RV dilation RVIDd 3.5 cm with mild hypertrophy LVIDd 5.2 cm AV PG 21(10) mmHg from LVOT? From Asc. Ao PG 2 mmHg No VSD shunt noted TAPSE 2.0 cm LV lateral tissue doppler mildly decreased LA Volume 17 ml/m2 Unable to visualize branch PA's. Coronaries not visualized on today's exam. Clinical Correlation Suggested Complete IE D. aorta PG 5 mmHg     Device Interrogation:  (08/17/2023)    Mode: DDD Lower limit rate: 60 bpm Upper tracking rate: 170 bpm    Tachy Zone VT1 Rate: > 180 bpm Therapies: monitor VT2 Rate: > 220 bpm Therapies: ATP before  charging and shock VF Therapies: ATP during charging and shock    Estimated longevity: 8 Years.    Reprogramming comments: ATR Fallback mode VDI --> DDI Atrial flutter response Off --> On Atrial flutter trigger rate - 200 bpm Atrial impedance beep alert off --> on Normal ronald a bland after v sense 85 ms -- > Smart PVARP - 240 ms --> 210 ms Rate hysteresis Off --> -10 ppm Rate Search Hysteresis 256 cycles Search AV Delay 330 ms --> 350 ms Shock impedance beep alert Off --> on Ventricular impedance Beep alert off --> on VT-1 initial sustained rate duration 03:00 mm:ss - 30:00 mm:ss VT initial sustained rate duration 03:00 mm:ss ->> 30:00 mm:ss    VT-NS x2 - 07 July received ATP x1; 17 Aug no therapy    General comments: Device interrogation and lead testing performed by [employer] rep. Device and leads WNL.    (07/12/2023)  REMOTE transmission received and data reviewed. Device and leads WNL. Battery longevity 8.5 yrs Atrial paced 24 % Ventricular paced 8 % ATRs reveal Twave over sensing 1 Treated VT episode- ~20 beats with successful conversion to sinus rhythm with 1 ATP    (04/10/2023)  REMOTE transmission received and data reviewed. Device and leads WNL. Battery longevity 9 yrs Atrial paced 24% Ventricular paced 7 % 6 ATR- appears oversensing 2 NSVT- possibly sinus tach      CPX: (08/17/2023)    The ECG portion of this study is negative for myocardial ischemia.    The patient's exercise capacity was average.    There were no arrhythmias during stress.    The test was stopped because the patient experienced fatigue.    Mild functional impairment associated with a normal breathing reserve, reduced oxygen saturation with exercise, suboptimal effort, and a normal AT. These findings are indicative of functional impairment secondary to deconditioning.    There was no ST segment deviation noted during stress.    Assessment / Plan:   Jian Leiva is a 23 y.o. male with Tetralogy of Fallot s/p intracardiac repair;  s/p Trifecta pulmonary valve replacement; Ventricular tachycardia s/p ICD; who presents to [facility] at [facility] for follow-up.    We made several minor changes to his device (see above).       Follow-up:   Continue current medications  Return to combined EP/ACHD clinic in 1 year with cardiac MRI.    Will need to use the 1.5 Rosanna MRI machine and have the [employer] Rep present.  Cardiac medications:    Metoprolol XL 50mg BID  Aspirin  SBE prophylaxis:    Yes.  Antibiotic prophylaxis is required prior to all dental procedures cleanings.    Please contact us if he has any questions or concerns.  Our clinic from his [phone] during office hours. For urgent night and weekend concerns, call [phone] and ask for the pediatric cardiologist on call to be paged.

## 2023-08-17 ENCOUNTER — CLINICAL SUPPORT (OUTPATIENT)
Dept: CARDIOLOGY | Facility: CLINIC | Age: 23
End: 2023-08-17
Attending: PEDIATRICS
Payer: COMMERCIAL

## 2023-08-17 ENCOUNTER — HOSPITAL ENCOUNTER (OUTPATIENT)
Dept: CARDIOLOGY | Facility: HOSPITAL | Age: 23
Discharge: HOME | End: 2023-08-17
Attending: PEDIATRICS
Payer: COMMERCIAL

## 2023-08-17 ENCOUNTER — OFFICE VISIT (OUTPATIENT)
Dept: CARDIOLOGY | Facility: CLINIC | Age: 23
End: 2023-08-17
Payer: COMMERCIAL

## 2023-08-17 VITALS
WEIGHT: 165.13 LBS | HEART RATE: 88 BPM | BODY MASS INDEX: 20.53 KG/M2 | WEIGHT: 165.13 LBS | OXYGEN SATURATION: 97 % | DIASTOLIC BLOOD PRESSURE: 67 MMHG | HEIGHT: 75 IN | SYSTOLIC BLOOD PRESSURE: 120 MMHG | HEART RATE: 88 BPM | BODY MASS INDEX: 20.53 KG/M2 | WEIGHT: 165.38 LBS | SYSTOLIC BLOOD PRESSURE: 106 MMHG | BODY MASS INDEX: 21.92 KG/M2 | HEART RATE: 73 BPM | HEIGHT: 75 IN | DIASTOLIC BLOOD PRESSURE: 67 MMHG | DIASTOLIC BLOOD PRESSURE: 67 MMHG | OXYGEN SATURATION: 97 % | SYSTOLIC BLOOD PRESSURE: 120 MMHG | HEIGHT: 73 IN

## 2023-08-17 DIAGNOSIS — I47.20 VT (VENTRICULAR TACHYCARDIA): ICD-10-CM

## 2023-08-17 DIAGNOSIS — Q21.3 TETRALOGY OF FALLOT: ICD-10-CM

## 2023-08-17 DIAGNOSIS — Z95.810 ICD (IMPLANTABLE CARDIOVERTER-DEFIBRILLATOR), DUAL, IN SITU: ICD-10-CM

## 2023-08-17 DIAGNOSIS — Q24.9 ADULT CONGENITAL HEART DISEASE: ICD-10-CM

## 2023-08-17 DIAGNOSIS — I45.10 COMPLETE RIGHT BUNDLE BRANCH BLOCK: ICD-10-CM

## 2023-08-17 DIAGNOSIS — I49.3 PVC'S (PREMATURE VENTRICULAR CONTRACTIONS): ICD-10-CM

## 2023-08-17 DIAGNOSIS — Q24.9 CONGENITAL MALFORMATION OF HEART, UNSPECIFIED: Primary | ICD-10-CM

## 2023-08-17 DIAGNOSIS — Z95.2 S/P PULMONARY VALVE REPLACEMENT: ICD-10-CM

## 2023-08-17 DIAGNOSIS — I37.0 NONRHEUMATIC PULMONARY VALVE STENOSIS: ICD-10-CM

## 2023-08-17 DIAGNOSIS — Q21.3 TETRALOGY OF FALLOT: Primary | ICD-10-CM

## 2023-08-17 LAB
AV DELAY - LONGEST: 320 MSEC
AV DELAY - SHORTEST: 110 MSEC
CHARGE TIME (SEC): 11.7 SEC
CV STRESS BASE HR: 73 BPM
DIASTOLIC BLOOD PRESSURE: 67 MMHG
HV IMPEDANCE (OHM): 53 OHM
IMPEDANCE RA LEAD (NATIVE): 345 OHMS
IMPEDANCE RA LEAD: 705 OHMS
OHS CV CPX 1 MINUTE RECOVERY HEART RATE: 164 BPM
OHS CV CPX 85 PERCENT MAX PREDICTED HEART RATE MALE: 168
OHS CV CPX ANAEROBIC THRESHOLD DIASTOLIC BLOOD PRESSURE: 76 MMHG
OHS CV CPX ANAEROBIC THRESHOLD HEART RATE: 169
OHS CV CPX ANAEROBIC THRESHOLD RATE PRESSURE PRODUCT: NORMAL
OHS CV CPX ANAEROBIC THRESHOLD SYSTOLIC BLOOD PRESSURE: 147
OHS CV CPX DATA GRADE - AT: 17.4
OHS CV CPX DATA GRADE - PEAK: 20.9
OHS CV CPX DATA O2 SAT - PEAK: 87
OHS CV CPX DATA O2 SAT - REST: 96
OHS CV CPX DATA SPEED - AT: 4.4
OHS CV CPX DATA SPEED - PEAK: 5.5
OHS CV CPX DATA TIME - AT: 8.45
OHS CV CPX DATA TIME - PEAK: 10
OHS CV CPX DATA VE/VCO2 - AT: 28
OHS CV CPX DATA VE/VCO2 - PEAK: 39
OHS CV CPX DATA VE/VO2 - AT: 23
OHS CV CPX DATA VE/VO2 - PEAK: 42
OHS CV CPX DATA VO2 - AT: 24.6
OHS CV CPX DATA VO2 - PEAK: 30.7
OHS CV CPX DATA VO2 - REST: 5.5
OHS CV CPX FEV1/FVC: 0.93
OHS CV CPX FORCED EXPIRATORY VOLUME: 4.26
OHS CV CPX FORCED VITAL CAPACITY (FVC): 4.58
OHS CV CPX HIGHEST VO: 43.9
OHS CV CPX MAX PREDICTED HEART RATE: 198
OHS CV CPX MAXIMAL VOLUNTARY VENTILATION (MVV) PREDICTED: 170.4
OHS CV CPX MAXIMAL VOLUNTARY VENTILATION (MVV): 139
OHS CV CPX MAXIUMUM EXERCISE VENTILATION (VE MAX): 95.7
OHS CV CPX PATIENT AGE: 22
OHS CV CPX PATIENT HEIGHT IN: 73
OHS CV CPX PATIENT IS FEMALE AGE 11-19: 0
OHS CV CPX PATIENT IS FEMALE AGE GREATER THAN 19: 0
OHS CV CPX PATIENT IS FEMALE AGE LESS THAN 11: 0
OHS CV CPX PATIENT IS FEMALE: 0
OHS CV CPX PATIENT IS MALE AGE 11-25: 1
OHS CV CPX PATIENT IS MALE AGE GREATER THAN 25: 0
OHS CV CPX PATIENT IS MALE AGE LESS THAN 11: 0
OHS CV CPX PATIENT IS MALE GREATER THAN 18: 1
OHS CV CPX PATIENT IS MALE LESS THAN OR EQUAL TO 18: 0
OHS CV CPX PATIENT IS MALE: 1
OHS CV CPX PATIENT WEIGHT RETURNED IN OZ: 2645.52
OHS CV CPX PEAK DIASTOLIC BLOOD PRESSURE: 56 MMHG
OHS CV CPX PEAK HEAR RATE: 179 BPM
OHS CV CPX PEAK RATE PRESSURE PRODUCT: NORMAL
OHS CV CPX PEAK SYSTOLIC BLOOD PRESSURE: 152 MMHG
OHS CV CPX PERCENT BODY FAT: 3.4
OHS CV CPX PERCENT MAX PREDICTED HEART RATE ACHIEVED: 90
OHS CV CPX PREDICTED VO2: 43.9 ML/KG/MIN
OHS CV CPX RATE PRESSURE PRODUCT PRESENTING: 7738
OHS CV CPX REST PET CO2: 28
OHS CV CPX VE/VCO2 SLOPE: 22.3
OHS CV DC PP MS1: 0.4 MS
OHS CV DC PP MS2: 0.4 MS
OHS CV DC PP V1: 2 V
OHS CV DC PP V2: 3.5 V
P/R-WAVE RA LEAD (NATIVE): 9.9 MV
P/R-WAVE RA LEAD: 4.2 MV
PV DELAY - LONGEST: 320 MSEC
PV DELAY - SHORTEST: 110 MSEC
STRESS ECHO POST EXERCISE DUR MIN: 10 MINUTES
STRESS ECHO POST EXERCISE DUR SEC: 0 SECONDS
SYSTOLIC BLOOD PRESSURE: 106 MMHG
THRESHOLD MS RA LEAD (NATIVE): 0.4 MS
THRESHOLD MS RA LEAD: 0.4 MS
THRESHOLD V RA LEAD (NATIVE): 1.7 V
THRESHOLD V RA LEAD: 0.7 V

## 2023-08-17 PROCEDURE — 99214 PR OFFICE/OUTPT VISIT, EST, LEVL IV, 30-39 MIN: ICD-10-PCS | Mod: 25,S$GLB,, | Performed by: PEDIATRICS

## 2023-08-17 PROCEDURE — 3008F BODY MASS INDEX DOCD: CPT | Mod: CPTII,S$GLB,, | Performed by: PEDIATRICS

## 2023-08-17 PROCEDURE — 99999 PR PBB SHADOW E&M-EST. PATIENT-LVL III: CPT | Mod: PBBFAC,,, | Performed by: PEDIATRICS

## 2023-08-17 PROCEDURE — 1159F PR MEDICATION LIST DOCUMENTED IN MEDICAL RECORD: ICD-10-PCS | Mod: CPTII,S$GLB,, | Performed by: PEDIATRICS

## 2023-08-17 PROCEDURE — 99214 PR OFFICE/OUTPT VISIT, EST, LEVL IV, 30-39 MIN: ICD-10-PCS | Mod: S$GLB,,, | Performed by: PEDIATRICS

## 2023-08-17 PROCEDURE — 94621 CARDIOPULMONARY EXERCISE TESTING (CUPID ONLY): ICD-10-PCS | Mod: 26,,, | Performed by: INTERNAL MEDICINE

## 2023-08-17 PROCEDURE — 99999 PR PBB SHADOW E&M-EST. PATIENT-LVL III: ICD-10-PCS | Mod: PBBFAC,,, | Performed by: PEDIATRICS

## 2023-08-17 PROCEDURE — 3008F PR BODY MASS INDEX (BMI) DOCUMENTED: ICD-10-PCS | Mod: CPTII,S$GLB,, | Performed by: PEDIATRICS

## 2023-08-17 PROCEDURE — 3078F DIAST BP <80 MM HG: CPT | Mod: CPTII,S$GLB,, | Performed by: PEDIATRICS

## 2023-08-17 PROCEDURE — 99214 OFFICE O/P EST MOD 30 MIN: CPT | Mod: S$GLB,,, | Performed by: PEDIATRICS

## 2023-08-17 PROCEDURE — 1159F MED LIST DOCD IN RCRD: CPT | Mod: CPTII,S$GLB,, | Performed by: PEDIATRICS

## 2023-08-17 PROCEDURE — 99999 PR PBB SHADOW E&M-EST. PATIENT-LVL I: CPT | Mod: PBBFAC,,,

## 2023-08-17 PROCEDURE — 3078F PR MOST RECENT DIASTOLIC BLOOD PRESSURE < 80 MM HG: ICD-10-PCS | Mod: CPTII,S$GLB,, | Performed by: PEDIATRICS

## 2023-08-17 PROCEDURE — 99214 OFFICE O/P EST MOD 30 MIN: CPT | Mod: 25,S$GLB,, | Performed by: PEDIATRICS

## 2023-08-17 PROCEDURE — 3074F PR MOST RECENT SYSTOLIC BLOOD PRESSURE < 130 MM HG: ICD-10-PCS | Mod: CPTII,S$GLB,, | Performed by: PEDIATRICS

## 2023-08-17 PROCEDURE — 94621 CARDIOPULM EXERCISE TESTING: CPT | Mod: 26,,, | Performed by: INTERNAL MEDICINE

## 2023-08-17 PROCEDURE — 93283 PRGRMG EVAL IMPLANTABLE DFB: CPT | Mod: S$GLB,,, | Performed by: PEDIATRICS

## 2023-08-17 PROCEDURE — 94621 CARDIOPULM EXERCISE TESTING: CPT

## 2023-08-17 PROCEDURE — 3074F SYST BP LT 130 MM HG: CPT | Mod: CPTII,S$GLB,, | Performed by: PEDIATRICS

## 2023-08-17 NOTE — Clinical Note
One year with me and EP along with a cardiac MRI.  Special instructions for MRI because of his ICD: Will need to use the 1.5 Rosanna MRI machine and have the [employer] rep adjust device before and after the procedure.  So we will have to talk to Dr. Pinedo to book on the right machine and we will have to get Beverly to make sure the [employer] rep is available to program the device.

## 2023-08-17 NOTE — PROGRESS NOTES
08/17/2023    re:Jian Leiva  OB:2000    Ruslan Busch, FNP-C  [address]  [place] [address]    [facility]     Jian Leiva is a 23 y.o. male with the following diagnoses:  1.  Tetralogy of Fallot status post complete repair of 5 months of age   - status post multiple catheter based procedures for branch pulmonary artery stenosis   - now status post pulmonary valve replacement with a 27 mm Trifecta valve with branch pulmonary arterioplasty in 2013   - mild to moderate pulmonary valve stenosis and likely severe pulmonary valve insufficiency, last cardiac MRI 2017   - mildly to moderately reduced right ventricular function with likely mild enlargement   - mildly enlarged aortic root without aortic insufficiency   - no symptoms  2.  Dual chamber ICD implant 2018 secondary to nonsustained ventricular tachycardia that persisted on high-dose beta-blockers    To summarize, my recommendations are as follows:  1.  Continue current medications  2.  Healthy diet, regular low intensity exercise  3.  SBE prophylaxis is indicated before dental work.  He should practice good dental hygiene and see the dentist regularly.  4.  Return to clinic in 1 year with cardiac MRI.  Will need to use the 1.5 Rosanna MRI machine and have the [employer] rep adjust device before and after the procedure.  5.  Continue regular follow-up with congenital electrophysiology.    Discussion:  He clinically looks great, and he is asymptomatic.  However, his pulmonary valve does leak significantly.  He will need a new pulmonary valve in the future, and he should be a candidate for a catheter based valve.  An MRI will help guide that decision making process in an asymptomatic patient.  We will get an MRI when we see him next year.    Interval history:  He denies any symptoms related to the cardiovascular system.  He denies chest pain, shortness of breath, dyspnea on exertion, cyanosis, edema,  palpitations, syncope, and near-syncope.    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  He works at [employer] in the [employer] section.  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  He lives with his mother.    The review of systems is as noted above. It is otherwise negative for other symptoms related to the general, neurological, psychiatric, endocrine, gastrointestinal, genitourinary, respiratory, dermatologic, musculoskeletal, hematologic, and immunologic systems.    Past Medical History:   Diagnosis Date    Complete right bundle branch block     ICD (implantable cardioverter-defibrillator) in place     PDA (patent ductus arteriosus)     Pulmonary artery stenosis, branch, central     Pulmonary artery stenosis of peripheral branch at or beyond the hilar bifurcation     Pulmonary insufficiency     Pulmonary stenosis     Pulmonary valve replaced     PVC (premature ventricular contraction)     Right pulmonary artery stenosis     TOF (tetralogy of Fallot)     VT (ventricular tachycardia) 06/2018    Holter      Past Surgical History:   Procedure Laterality Date    CARDIAC CATHETERIZATION  2001    [facility]: severe tetralogy of FAllot with multiple levels of obstruction (infundibular, valvular, supravalvular), confluent pulmonary arteries, normal PA pressures with balance ventricular pressures. The cath was not able to be completed, however, due to hypercyanotic spell toward the end of the procedure, he was transferred to [facility]    CARDIAC CATHETERIZATION  01/02/2004    [facility]:TOF s/p repair;Branch pulmonary arteries- right ventricular pressures pre-stent placement are 73% of systemic and post-stent placement are 41% of systemic. RPA stent inflated to 12mm in diameter with no residual stenosis. Left pulmonary artery stent inflated to 15mm, however, has a persistent residual stenosis at the origin of the left pulmonary artery    CARDIAC  "CATHETERIZATION  05/29/2007    [facility]:TOF, repaired;Bilateral branch PA stenosis with large "biliary stents";RPA dilated from 16mm to 18mm; LPA dilated from 6mm to 13mm; no residual branch PA gradients;MInimal RV outflow gradient, 10-12mmHg;Mild RV enlargement, normal RV function;No shunts;No aortopulmonary collaterals;Closing RVp/LVp = 0.4;Mild tricuspid valve insufficiency    CARDIAC CATHETERIZATION  10/30/2009    [facility]:Repaired tetralogy of Fallot (transannular patch technique with bilateral branch pulmonary stenosis);Mildly under expanded stents, re-expanded to 16mm bilaterally. Post-dilation gradient 6-9mmHg as a result of free pulmonary valve insufficiency;Free pulmonary valve insufficiency, mild RVE;Normal right and left ventricular function;No shunts identified;Left aorta;Normal Laura;No aortopulmo     CARDIAC CATHETERIZATION  07/19/2011    [facility]:TOF;Right and left branch pulmonary artery stenosis;s/p stent placement of right and left branch pulmonary arteries, P308;s/p redilation of right and left PA branches and stents with 12mm balloons. Gradient across branch PAs was roughly 20-25mmHg before and 15-20mmHg after. RV pressure remains about 50% systemic;     CARDIAC CATHETERIZATION  08/13/2012    [facility]:TOF;Right and left branch pulmonary stenosis;Status post stent placement in the right and left branch pulmonary arteries;Status post re-dilation of stent 2007 & 2011. Due to PA positioning (branch PA stents allocated adjacent to each other), stent dilation cannot be performed. Needs augmentation of branch vessels surgically, but possibly valve replacement to be scheduduled at same time    CARDIAC DEFIBRILLATOR PLACEMENT  07/18/2018    [facility]: ICD DYNAGEN SUBHA GONZALEZ D152: Serial No. [id number]; LEAD RELIANCE DF4 0293 64CM: Serial No. [id number]; LEAD INGEVITY IS-1 MRI 7741 52CM: Serial No. [id number];     CARDIAC MRI  03/30/2010    [facility]:Normal LV volume with LVEF 40%. Global hypokinesis which may be secondary to " anesthesia;Increased RV volume with RVEF 27%;Unrestricted PI with regurgitation;Good pulmonary blood flow by MRA with gadolinium    CARDIAC MRI  11/25/2014    [facility]:Normal left ventricular volumes with LVEF 55%;Increased right ventricular volumes with RVEF 39%;QUINTIN;Pulmonary prosthetic valve with mild PI and peak velocity of 3.7m/sec;RVEDV: 143 cc/m2 for BSA    CARDIAC MRI  12/08/2015    [facility]: RVEDV 138cc/m2; PV 3m/s; mild PI; prox RPA stenosis    CARDIAC SURGERY  2001    [facility]: Complete repair of tetralogy of Fallot with transannular patch;Ligation of PDA    CARDIAC SURGERY  06/14/2013    [facility]:Pulmonary valve replacement using 27mm Trifecta bovine pericardial valve;Bilateral patch pulmonary arterioplasty using bovine pericardium. Incisions made into the left and right branch PAs, anterior aspects of the stents were resected, and PA branches were patched with bovine pericardial patch with running suture    WISDOM TOOTH EXTRACTION       Family History   Problem Relation Age of Onset    Hypertension Mother     No Known Problems Father     No Known Problems Sister     No Known Problems Brother     No Known Problems Brother     No Known Problems Sister     No Known Problems Sister     No Known Problems Maternal Grandfather     Congenital heart disease Neg Hx     Early death Neg Hx     Hyperlipidemia Neg Hx     Heart attacks under age 50 Neg Hx     Arrhythmia Neg Hx      Social History     Socioeconomic History    Marital status: Single   Tobacco Use    Smoking status: Never    Smokeless tobacco: Never   Social History Narrative    Jian live with parents. He works at [employer] and goes to school for [employer]. No smoke exposure.     Current Outpatient Medications on File Prior to Visit   Medication Sig Dispense Refill    aspirin 81 MG Chew Take 81 mg by mouth once daily.      fluticasone propionate (FLONASE) 50 mcg/actuation nasal spray 1 spray by Nasal route daily as needed.      metoprolol  "succinate (TOPROL-XL) 50 MG 24 hr tablet TAKE 1 TABLET(50 MG) BY MOUTH TWICE DAILY 60 tablet 2     No current facility-administered medications on file prior to visit.     Review of patient's allergies indicates:   Allergen Reactions    Kaopectate (attapulgite)      Vitals:    08/17/23 1015   BP: 120/67   BP Location: Left arm   Patient Position: Sitting   Pulse: 88   SpO2: 97%   Weight: 74.9 kg (165 lb 2 oz)   Height: 6' 3.04" (1.906 m)     In general, he is a very healthy-appearing nondysmorphic male in no apparent distress.  The eyes, nares, and oropharynx are clear.  Eyelids and conjunctiva are normal without drainage or erythema.  Pupils equal and round bilaterally.  The head is normocephalic and atraumatic.  The neck is supple without jugular venous distention or thyroid enlargement.  The lungs are clear to auscultation bilaterally.  There is a well healed sternotomy.  First heart sound normal, second fixed and split.  2/6 systolic ejection murmur, 2/4 diastolic murmur.  The abdominal exam is benign without hepatosplenomegaly, tenderness, or distention.  Pulses are normal in all 4 extremities with brisk capillary refill and no clubbing, cyanosis, or edema.  No rashes are noted.     I personally reviewed the following tests performed today and my interpretation follows:  The EKG performed in clinic today reveals an atrially paced rhythm with good capture.  A right bundle branch block is present.  The QRS duration is 185 milliseconds.    Holter 2023:  Sinus rhythm with intraventricular conduction delay predominates.  Occasional atrial and ventricular pacing noted.  Normal HR range.  Patient-triggered events (2) correlate to sinus rhythm.  No significant ectopy burden.    Echo April 2023:  S/P Repair Tetralogy of Fallot (transannular patch), Branch PA stenosis - S/P bilateral stents. Pulmonary valve replacement with 27 mm Trifecta bovine pericardial valve and bilateral patch pulmonary arterioplasty, June 2013.  " Moderate stenosis across the bioprosthetic pulmonary valve PG 47(28), 52 (28) mmHg. Free pulmonary insufficiency. Dyskinetic ventricular septum, but good LVPW motion on PLAX virw? LV apical view not as good Pacemaker lead seen crossing the TV and inserting into the RV with only trivial tricuspid insufficiency. IVSDd 1.14 cm RVSP 42 mmHg with mildly reduced function Mild RA & RV dilation RVIDd 3.5 cm with mild hypertrophy LVIDd 5.2 cm AV PG 21(10) mmHg from LVOT? From Asc. Ao PG 2 mmHg No VSD shunt noted TAPSE 2.0 cm LV lateral tissue doppler mildly decreased LA Volume 17 ml/m2 Unable to visualize branch PA's. Coronaries not visualized on today's exam. Clinical Correlation Suggested Complete IE D. aorta PG 5 mmHg     CPX today:    The ECG portion of this study is negative for myocardial ischemia.    The patient's exercise capacity was average.    There were no arrhythmias during stress.    The test was stopped because the patient experienced fatigue.    Mild functional impairment associated with a normal breathing reserve, reduced oxygen saturation with exercise, suboptimal effort, and a normal AT. These findings are indicative of functional impairment secondary to deconditioning.    There was no ST segment deviation noted during stress.    A cardiac MRI performed 2017 revealed:  Conclusion:     1. Mildly increased right ventricular volumes. (RVEDV 120cc/m2 for BSA, RVESV 73 cc/m2 for BSA).  RVEF 39%.   2. Increase LV systolic left ventricular volume with LVEF 48 %.   3. Bioprosthetic pulmonary valve with a peak systolic velocity of 3.8 m/sec and PI regurgitation fraction of 10% and regurgitation volume of 9 cc consistent with mild PI   4. Distal PA?s appear patent distally.   5. Compared to prior MRI in 2015,the pulmonary valve systolic velocity has increased.    Thank you for referring this patient to our clinic.  Please call with any questions.    Sincerely,        Sukhi Gibson MD  Pediatric Cardiology  Adult  Congenital Heart Disease  Pediatric Heart Failure and Transplantation  [facility]  [address] [place]  [address]  [phone]

## 2023-10-06 ENCOUNTER — PATIENT MESSAGE (OUTPATIENT)
Dept: PEDIATRIC CARDIOLOGY | Facility: CLINIC | Age: 23
End: 2023-10-06
Payer: COMMERCIAL

## 2023-10-09 ENCOUNTER — HOSPITAL ENCOUNTER (OUTPATIENT)
Dept: PEDIATRIC CARDIOLOGY | Facility: HOSPITAL | Age: 23
Discharge: HOME | End: 2023-10-09
Attending: PEDIATRICS
Payer: COMMERCIAL

## 2023-10-09 DIAGNOSIS — Q24.9 ADULT CONGENITAL HEART DISEASE: ICD-10-CM

## 2023-10-09 DIAGNOSIS — I47.20 VT (VENTRICULAR TACHYCARDIA): ICD-10-CM

## 2023-10-09 DIAGNOSIS — I45.10 COMPLETE RIGHT BUNDLE BRANCH BLOCK: ICD-10-CM

## 2023-10-09 DIAGNOSIS — I49.3 PVC'S (PREMATURE VENTRICULAR CONTRACTIONS): ICD-10-CM

## 2023-10-09 DIAGNOSIS — Q21.3 TETRALOGY OF FALLOT: ICD-10-CM

## 2023-10-09 PROCEDURE — 93295 CV ICD REMOTE PEDIATRICS (CUPID ONLY): ICD-10-PCS | Mod: ,,, | Performed by: PEDIATRICS

## 2023-10-09 PROCEDURE — 93296 REM INTERROG EVL PM/IDS: CPT

## 2023-10-09 PROCEDURE — 93295 DEV INTERROG REMOTE 1/2/MLT: CPT | Mod: ,,, | Performed by: PEDIATRICS

## 2023-10-26 LAB
AV DELAY - LONGEST: 320 MSEC
AV DELAY - SHORTEST: 110 MSEC
CHARGE TIME (SEC): 11.7 SEC
HV IMPEDANCE (OHM): 52 OHM
IMPEDANCE RA LEAD (NATIVE): 351 OHMS
IMPEDANCE RA LEAD: 705 OHMS
OHS CV DC PP MS1: 0.4 MS
OHS CV DC PP MS2: 0.4 MS
OHS CV DC PP V1: 2 V
OHS CV DC PP V2: 3.5 V
P/R-WAVE RA LEAD (NATIVE): 11.6 MV
P/R-WAVE RA LEAD: 3.8 MV
PV DELAY - LONGEST: 320 MSEC
PV DELAY - SHORTEST: 110 MSEC

## 2024-01-05 ENCOUNTER — PATIENT MESSAGE (OUTPATIENT)
Dept: PEDIATRIC CARDIOLOGY | Facility: CLINIC | Age: 24
End: 2024-01-05
Payer: COMMERCIAL

## 2024-01-08 ENCOUNTER — HOSPITAL ENCOUNTER (OUTPATIENT)
Dept: PEDIATRIC CARDIOLOGY | Facility: HOSPITAL | Age: 24
Discharge: HOME | End: 2024-01-08
Attending: PEDIATRICS
Payer: COMMERCIAL

## 2024-01-08 DIAGNOSIS — Z95.2 S/P PULMONARY VALVE REPLACEMENT: ICD-10-CM

## 2024-01-08 DIAGNOSIS — Q21.3 TETRALOGY OF FALLOT: ICD-10-CM

## 2024-01-08 DIAGNOSIS — I47.20 VT (VENTRICULAR TACHYCARDIA): Primary | ICD-10-CM

## 2024-01-08 DIAGNOSIS — Q24.9 ADULT CONGENITAL HEART DISEASE: ICD-10-CM

## 2024-01-08 DIAGNOSIS — Z95.810 ICD (IMPLANTABLE CARDIOVERTER-DEFIBRILLATOR), DUAL, IN SITU: ICD-10-CM

## 2024-01-08 DIAGNOSIS — I49.3 PVC'S (PREMATURE VENTRICULAR CONTRACTIONS): ICD-10-CM

## 2024-01-08 DIAGNOSIS — I47.20 VT (VENTRICULAR TACHYCARDIA): ICD-10-CM

## 2024-01-08 DIAGNOSIS — I45.10 COMPLETE RIGHT BUNDLE BRANCH BLOCK: ICD-10-CM

## 2024-01-08 PROCEDURE — 93295 DEV INTERROG REMOTE 1/2/MLT: CPT | Mod: ,,, | Performed by: PEDIATRICS

## 2024-01-08 PROCEDURE — 93296 REM INTERROG EVL PM/IDS: CPT

## 2024-01-09 LAB
AV DELAY - LONGEST: 320 MSEC
AV DELAY - SHORTEST: 110 MSEC
CHARGE TIME (SEC): 11.8 SEC
HV IMPEDANCE (OHM): 59 OHM
IMPEDANCE RA LEAD (NATIVE): 361 OHMS
IMPEDANCE RA LEAD: 749 OHMS
OHS CV DC PP MS1: 0.4 MS
OHS CV DC PP MS2: 0.4 MS
OHS CV DC PP V1: 2 V
OHS CV DC PP V2: 3.5 V
P/R-WAVE RA LEAD (NATIVE): 10.5 MV
P/R-WAVE RA LEAD: 6.1 MV
PV DELAY - LONGEST: 320 MSEC
PV DELAY - SHORTEST: 110 MSEC

## 2024-04-05 ENCOUNTER — PATIENT MESSAGE (OUTPATIENT)
Dept: PEDIATRIC CARDIOLOGY | Facility: CLINIC | Age: 24
End: 2024-04-05
Payer: COMMERCIAL

## 2024-04-08 ENCOUNTER — HOSPITAL ENCOUNTER (OUTPATIENT)
Dept: PEDIATRIC CARDIOLOGY | Facility: HOSPITAL | Age: 24
Discharge: HOME | End: 2024-04-08
Attending: PEDIATRICS
Payer: COMMERCIAL

## 2024-04-08 DIAGNOSIS — Q24.9 ADULT CONGENITAL HEART DISEASE: ICD-10-CM

## 2024-04-08 DIAGNOSIS — Z95.2 S/P PULMONARY VALVE REPLACEMENT: ICD-10-CM

## 2024-04-08 DIAGNOSIS — Q21.3 TETRALOGY OF FALLOT: ICD-10-CM

## 2024-04-08 DIAGNOSIS — I45.10 COMPLETE RIGHT BUNDLE BRANCH BLOCK: ICD-10-CM

## 2024-04-08 DIAGNOSIS — Z95.810 ICD (IMPLANTABLE CARDIOVERTER-DEFIBRILLATOR), DUAL, IN SITU: ICD-10-CM

## 2024-04-08 DIAGNOSIS — I47.20 VT (VENTRICULAR TACHYCARDIA): ICD-10-CM

## 2024-04-08 PROCEDURE — 93296 REM INTERROG EVL PM/IDS: CPT

## 2024-04-08 PROCEDURE — 93295 DEV INTERROG REMOTE 1/2/MLT: CPT | Mod: ,,, | Performed by: PEDIATRICS

## 2024-04-23 LAB
AV DELAY - LONGEST: 320 MSEC
AV DELAY - SHORTEST: 110 MSEC
CHARGE TIME (SEC): 11.8 SEC
HV IMPEDANCE (OHM): 64 OHM
IMPEDANCE RA LEAD (NATIVE): 373 OHMS
IMPEDANCE RA LEAD: 760 OHMS
OHS CV DC PP MS1: 0.4 MS
OHS CV DC PP MS2: 0.4 MS
OHS CV DC PP V1: 2 V
OHS CV DC PP V2: 3.5 V
P/R-WAVE RA LEAD (NATIVE): 10.5 MV
P/R-WAVE RA LEAD: 3.6 MV
PV DELAY - LONGEST: 320 MSEC
PV DELAY - SHORTEST: 110 MSEC

## 2024-07-05 ENCOUNTER — PATIENT MESSAGE (OUTPATIENT)
Dept: PEDIATRIC CARDIOLOGY | Facility: CLINIC | Age: 24
End: 2024-07-05
Payer: COMMERCIAL

## 2024-07-08 ENCOUNTER — HOSPITAL ENCOUNTER (OUTPATIENT)
Dept: PEDIATRIC CARDIOLOGY | Facility: HOSPITAL | Age: 24
Discharge: HOME | End: 2024-07-08
Attending: PEDIATRICS
Payer: COMMERCIAL

## 2024-07-08 DIAGNOSIS — I47.20 VT (VENTRICULAR TACHYCARDIA): ICD-10-CM

## 2024-07-08 DIAGNOSIS — I45.10 COMPLETE RIGHT BUNDLE BRANCH BLOCK: ICD-10-CM

## 2024-07-08 DIAGNOSIS — Z95.2 S/P PULMONARY VALVE REPLACEMENT: ICD-10-CM

## 2024-07-08 DIAGNOSIS — Z95.810 ICD (IMPLANTABLE CARDIOVERTER-DEFIBRILLATOR), DUAL, IN SITU: ICD-10-CM

## 2024-07-08 DIAGNOSIS — Q21.3 TETRALOGY OF FALLOT: ICD-10-CM

## 2024-07-08 DIAGNOSIS — Q24.9 ADULT CONGENITAL HEART DISEASE: ICD-10-CM

## 2024-07-08 PROCEDURE — 93296 REM INTERROG EVL PM/IDS: CPT

## 2024-07-08 PROCEDURE — 93295 DEV INTERROG REMOTE 1/2/MLT: CPT | Mod: ,,, | Performed by: PEDIATRICS

## 2024-07-09 LAB
AV DELAY - LONGEST: 320 MSEC
AV DELAY - SHORTEST: 110 MSEC
CHARGE TIME (SEC): 12 SEC
HV IMPEDANCE (OHM): 66 OHM
IMPEDANCE RA LEAD (NATIVE): 392 OHMS
IMPEDANCE RA LEAD: 769 OHMS
OHS CV DC PP MS1: 0.4 MS
OHS CV DC PP MS2: 0.4 MS
OHS CV DC PP V1: 2 V
OHS CV DC PP V2: 3.5 V
P/R-WAVE RA LEAD (NATIVE): 11.5 MV
P/R-WAVE RA LEAD: 5.8 MV
PV DELAY - LONGEST: 320 MSEC
PV DELAY - SHORTEST: 110 MSEC

## 2024-07-26 DIAGNOSIS — I45.10 COMPLETE RIGHT BUNDLE BRANCH BLOCK (RBBB): ICD-10-CM

## 2024-07-26 DIAGNOSIS — Z95.810 ICD (IMPLANTABLE CARDIOVERTER-DEFIBRILLATOR) IN PLACE: ICD-10-CM

## 2024-07-26 DIAGNOSIS — I47.20 VT (VENTRICULAR TACHYCARDIA): ICD-10-CM

## 2024-07-26 DIAGNOSIS — I49.3 PVCS (PREMATURE VENTRICULAR CONTRACTIONS): ICD-10-CM

## 2024-07-26 RX ORDER — METOPROLOL SUCCINATE 50 MG/1
50 TABLET, EXTENDED RELEASE ORAL 2 TIMES DAILY
Qty: 60 TABLET | Refills: 2 | Status: SHIPPED | OUTPATIENT
Start: 2024-07-26

## 2024-10-04 ENCOUNTER — PATIENT MESSAGE (OUTPATIENT)
Dept: PEDIATRIC CARDIOLOGY | Facility: CLINIC | Age: 24
End: 2024-10-04
Payer: COMMERCIAL

## 2024-10-07 ENCOUNTER — HOSPITAL ENCOUNTER (OUTPATIENT)
Dept: PEDIATRIC CARDIOLOGY | Facility: HOSPITAL | Age: 24
Discharge: HOME | End: 2024-10-07
Attending: PEDIATRICS
Payer: COMMERCIAL

## 2024-10-07 DIAGNOSIS — I47.20 VT (VENTRICULAR TACHYCARDIA): ICD-10-CM

## 2024-10-07 DIAGNOSIS — Z95.810 ICD (IMPLANTABLE CARDIOVERTER-DEFIBRILLATOR), DUAL, IN SITU: ICD-10-CM

## 2024-10-07 DIAGNOSIS — Z95.2 S/P PULMONARY VALVE REPLACEMENT: ICD-10-CM

## 2024-10-07 DIAGNOSIS — Q21.3 TETRALOGY OF FALLOT: ICD-10-CM

## 2024-10-07 DIAGNOSIS — I45.10 COMPLETE RIGHT BUNDLE BRANCH BLOCK: ICD-10-CM

## 2024-10-07 DIAGNOSIS — Q24.9 ADULT CONGENITAL HEART DISEASE: ICD-10-CM

## 2024-10-07 PROCEDURE — 93295 DEV INTERROG REMOTE 1/2/MLT: CPT | Mod: ,,, | Performed by: PEDIATRICS

## 2024-10-07 PROCEDURE — 93296 REM INTERROG EVL PM/IDS: CPT

## 2024-10-09 LAB
AV DELAY - LONGEST: 320 MSEC
AV DELAY - SHORTEST: 110 MSEC
CHARGE TIME (SEC): 12 SEC
IMPEDANCE RA LEAD (NATIVE): 402 OHMS
IMPEDANCE RA LEAD: 732 OHMS
OHS CV DC PP MS1: 0.4 MS
OHS CV DC PP MS2: 0.4 MS
OHS CV DC PP V1: 2 V
OHS CV DC PP V2: 3.5 V
P/R-WAVE RA LEAD (NATIVE): 12.4 MV
P/R-WAVE RA LEAD: 2.2 MV
PV DELAY - LONGEST: 320 MSEC
PV DELAY - SHORTEST: 110 MSEC

## 2025-01-03 ENCOUNTER — PATIENT MESSAGE (OUTPATIENT)
Dept: PEDIATRIC CARDIOLOGY | Facility: CLINIC | Age: 25
End: 2025-01-03
Payer: COMMERCIAL

## 2025-01-06 ENCOUNTER — HOSPITAL ENCOUNTER (OUTPATIENT)
Dept: PEDIATRIC CARDIOLOGY | Facility: HOSPITAL | Age: 25
Discharge: HOME | End: 2025-01-06
Attending: PEDIATRICS
Payer: COMMERCIAL

## 2025-01-06 DIAGNOSIS — Z95.810 ICD (IMPLANTABLE CARDIOVERTER-DEFIBRILLATOR), DUAL, IN SITU: ICD-10-CM

## 2025-01-06 DIAGNOSIS — Q24.9 ADULT CONGENITAL HEART DISEASE: ICD-10-CM

## 2025-01-06 DIAGNOSIS — I45.10 COMPLETE RIGHT BUNDLE BRANCH BLOCK: ICD-10-CM

## 2025-01-06 DIAGNOSIS — I47.20 VT (VENTRICULAR TACHYCARDIA): ICD-10-CM

## 2025-01-06 DIAGNOSIS — Z95.2 S/P PULMONARY VALVE REPLACEMENT: ICD-10-CM

## 2025-01-06 DIAGNOSIS — I49.3 PVC'S (PREMATURE VENTRICULAR CONTRACTIONS): ICD-10-CM

## 2025-01-06 DIAGNOSIS — Q21.3 TETRALOGY OF FALLOT: ICD-10-CM

## 2025-01-06 DIAGNOSIS — I37.0 NONRHEUMATIC PULMONARY VALVE STENOSIS: Primary | ICD-10-CM

## 2025-01-06 PROCEDURE — 93296 REM INTERROG EVL PM/IDS: CPT

## 2025-01-06 PROCEDURE — 93295 DEV INTERROG REMOTE 1/2/MLT: CPT | Mod: ,,, | Performed by: PEDIATRICS

## 2025-01-08 LAB
AV DELAY - LONGEST: 320 MSEC
AV DELAY - SHORTEST: 110 MSEC
CHARGE TIME (SEC): 12.1 SEC
HV IMPEDANCE (OHM): 67 OHM
OHS CV DC PP MS1: 0.4 MS
OHS CV DC PP MS2: 0.4 MS
OHS CV DC PP V1: 2 V
OHS CV DC PP V2: 3.5 V
P/R-WAVE RA LEAD (NATIVE): 14 MV
P/R-WAVE RA LEAD: 5.8 MV
PV DELAY - LONGEST: 320 MSEC
PV DELAY - SHORTEST: 110 MSEC

## 2025-04-02 ENCOUNTER — PATIENT MESSAGE (OUTPATIENT)
Dept: PEDIATRIC CARDIOLOGY | Facility: CLINIC | Age: 25
End: 2025-04-02
Payer: COMMERCIAL

## 2025-04-07 ENCOUNTER — HOSPITAL ENCOUNTER (OUTPATIENT)
Dept: PEDIATRIC CARDIOLOGY | Facility: HOSPITAL | Age: 25
Discharge: HOME | End: 2025-04-07
Attending: PEDIATRICS
Payer: COMMERCIAL

## 2025-04-07 DIAGNOSIS — I49.3 PVC'S (PREMATURE VENTRICULAR CONTRACTIONS): ICD-10-CM

## 2025-04-07 DIAGNOSIS — I47.20 VT (VENTRICULAR TACHYCARDIA): ICD-10-CM

## 2025-04-07 DIAGNOSIS — Q24.9 ADULT CONGENITAL HEART DISEASE: ICD-10-CM

## 2025-04-07 DIAGNOSIS — Q21.3 TETRALOGY OF FALLOT: ICD-10-CM

## 2025-04-07 DIAGNOSIS — Z95.810 ICD (IMPLANTABLE CARDIOVERTER-DEFIBRILLATOR), DUAL, IN SITU: ICD-10-CM

## 2025-04-07 DIAGNOSIS — I37.0 NONRHEUMATIC PULMONARY VALVE STENOSIS: ICD-10-CM

## 2025-04-07 PROCEDURE — 93296 REM INTERROG EVL PM/IDS: CPT

## 2025-04-07 PROCEDURE — 93295 DEV INTERROG REMOTE 1/2/MLT: CPT | Mod: ,,, | Performed by: PEDIATRICS

## 2025-04-09 LAB
AV DELAY - FIXED: 110 MSEC
AV DELAY - LONGEST: 320 MSEC
CHARGE TIME (SEC): 12.1 SEC
HV IMPEDANCE (OHM): 53 OHM
IMPEDANCE RA LEAD (NATIVE): 392 OHMS
IMPEDANCE RA LEAD: 720 OHMS
OHS CV DC PP MS1: 0.4 MS
OHS CV DC PP MS2: 0.4 MS
OHS CV DC PP V1: 2 V
OHS CV DC PP V2: 3.5 V
P/R-WAVE RA LEAD (NATIVE): 13.6 MV
P/R-WAVE RA LEAD: 5.1 MV
PV DELAY - FIXED: 110 MSEC
PV DELAY - LONGEST: 320 MSEC

## 2025-04-28 ENCOUNTER — PATIENT MESSAGE (OUTPATIENT)
Dept: PEDIATRIC CARDIOLOGY | Facility: CLINIC | Age: 25
End: 2025-04-28
Payer: COMMERCIAL

## 2025-05-06 DIAGNOSIS — Q24.9 ADULT CONGENITAL HEART DISEASE: ICD-10-CM

## 2025-05-06 DIAGNOSIS — Q24.9 CONGENITAL MALFORMATION OF HEART, UNSPECIFIED: Primary | ICD-10-CM

## 2025-05-06 DIAGNOSIS — I45.10 COMPLETE RIGHT BUNDLE BRANCH BLOCK: ICD-10-CM

## 2025-05-06 DIAGNOSIS — Z95.810 ICD (IMPLANTABLE CARDIOVERTER-DEFIBRILLATOR), DUAL, IN SITU: ICD-10-CM

## 2025-05-06 DIAGNOSIS — Z95.2 S/P PULMONARY VALVE REPLACEMENT: ICD-10-CM

## 2025-05-06 DIAGNOSIS — Q21.3 TETRALOGY OF FALLOT: ICD-10-CM

## 2025-05-30 ENCOUNTER — PATIENT MESSAGE (OUTPATIENT)
Dept: CARDIOLOGY | Facility: CLINIC | Age: 25
End: 2025-05-30
Payer: COMMERCIAL

## 2025-06-24 DIAGNOSIS — I45.10 COMPLETE RIGHT BUNDLE BRANCH BLOCK (RBBB): ICD-10-CM

## 2025-06-24 DIAGNOSIS — I49.3 PVCS (PREMATURE VENTRICULAR CONTRACTIONS): ICD-10-CM

## 2025-06-24 DIAGNOSIS — I47.20 VT (VENTRICULAR TACHYCARDIA): ICD-10-CM

## 2025-06-24 DIAGNOSIS — Z95.810 ICD (IMPLANTABLE CARDIOVERTER-DEFIBRILLATOR) IN PLACE: ICD-10-CM

## 2025-06-24 RX ORDER — METOPROLOL SUCCINATE 50 MG/1
50 TABLET, EXTENDED RELEASE ORAL 2 TIMES DAILY
Qty: 60 TABLET | Refills: 2 | Status: SHIPPED | OUTPATIENT
Start: 2025-06-24

## 2025-07-02 ENCOUNTER — PATIENT MESSAGE (OUTPATIENT)
Dept: PEDIATRIC CARDIOLOGY | Facility: CLINIC | Age: 25
End: 2025-07-02
Payer: COMMERCIAL

## 2025-07-07 ENCOUNTER — HOSPITAL ENCOUNTER (OUTPATIENT)
Dept: PEDIATRIC CARDIOLOGY | Facility: HOSPITAL | Age: 25
Discharge: HOME | End: 2025-07-07
Attending: PEDIATRICS
Payer: COMMERCIAL

## 2025-07-07 DIAGNOSIS — Q24.9 ADULT CONGENITAL HEART DISEASE: ICD-10-CM

## 2025-07-07 DIAGNOSIS — I37.0 NONRHEUMATIC PULMONARY VALVE STENOSIS: ICD-10-CM

## 2025-07-07 DIAGNOSIS — Z95.810 ICD (IMPLANTABLE CARDIOVERTER-DEFIBRILLATOR), DUAL, IN SITU: ICD-10-CM

## 2025-07-07 DIAGNOSIS — I49.3 PVC'S (PREMATURE VENTRICULAR CONTRACTIONS): ICD-10-CM

## 2025-07-07 DIAGNOSIS — Q21.3 TETRALOGY OF FALLOT: ICD-10-CM

## 2025-07-07 DIAGNOSIS — I47.20 VT (VENTRICULAR TACHYCARDIA): ICD-10-CM

## 2025-07-07 LAB
AV DELAY - LONGEST: 320 MSEC
AV DELAY - SHORTEST: 110 MSEC
CHARGE TIME (SEC): 12.3 SEC
HV IMPEDANCE (OHM): 67 OHM
IMPEDANCE RA LEAD (NATIVE): 399 OHMS
IMPEDANCE RA LEAD: 757 OHMS
OHS CV DC PP MS1: 0.4 MS
OHS CV DC PP MS2: 0.4 MS
OHS CV DC PP V1: 2 V
OHS CV DC PP V2: 3.5 V
P/R-WAVE RA LEAD (NATIVE): 16.1 MV
P/R-WAVE RA LEAD: 2.5 MV
PV DELAY - LONGEST: 320 MSEC
PV DELAY - SHORTEST: 110 MSEC

## 2025-07-07 PROCEDURE — 93296 REM INTERROG EVL PM/IDS: CPT

## 2025-07-07 PROCEDURE — 93295 DEV INTERROG REMOTE 1/2/MLT: CPT | Mod: ,,, | Performed by: PEDIATRICS

## 2025-07-31 ENCOUNTER — TELEPHONE (OUTPATIENT)
Dept: CARDIOLOGY | Facility: CLINIC | Age: 25
End: 2025-07-31
Payer: COMMERCIAL

## 2025-07-31 DIAGNOSIS — Z95.2 S/P PULMONARY VALVE REPLACEMENT: Primary | ICD-10-CM

## 2025-07-31 NOTE — TELEPHONE ENCOUNTER
CXR scheduled for 4 PM today prior to cardiac MRI next week. Patient verbalized understanding all information provided

## 2025-08-01 ENCOUNTER — PATIENT MESSAGE (OUTPATIENT)
Dept: CARDIOLOGY | Facility: CLINIC | Age: 25
End: 2025-08-01
Payer: COMMERCIAL

## 2025-08-07 ENCOUNTER — OFFICE VISIT (OUTPATIENT)
Dept: CARDIOLOGY | Facility: CLINIC | Age: 25
End: 2025-08-07
Payer: COMMERCIAL

## 2025-08-07 ENCOUNTER — HOSPITAL ENCOUNTER (OUTPATIENT)
Dept: PEDIATRIC CARDIOLOGY | Facility: HOSPITAL | Age: 25
Discharge: HOME | End: 2025-08-07
Attending: PEDIATRICS
Payer: COMMERCIAL

## 2025-08-07 ENCOUNTER — HOSPITAL ENCOUNTER (OUTPATIENT)
Dept: CARDIOLOGY | Facility: CLINIC | Age: 25
Discharge: HOME | End: 2025-08-07
Attending: PEDIATRICS
Payer: COMMERCIAL

## 2025-08-07 ENCOUNTER — HOSPITAL ENCOUNTER (OUTPATIENT)
Dept: RADIOLOGY | Facility: HOSPITAL | Age: 25
Discharge: HOME | End: 2025-08-07
Attending: PEDIATRICS
Payer: COMMERCIAL

## 2025-08-07 VITALS
HEART RATE: 62 BPM | HEIGHT: 75 IN | SYSTOLIC BLOOD PRESSURE: 121 MMHG | BODY MASS INDEX: 26.37 KG/M2 | WEIGHT: 212.06 LBS | SYSTOLIC BLOOD PRESSURE: 121 MMHG | OXYGEN SATURATION: 100 % | HEART RATE: 62 BPM | BODY MASS INDEX: 26.37 KG/M2 | WEIGHT: 212.06 LBS | HEIGHT: 75 IN | OXYGEN SATURATION: 100 % | DIASTOLIC BLOOD PRESSURE: 65 MMHG | DIASTOLIC BLOOD PRESSURE: 65 MMHG

## 2025-08-07 DIAGNOSIS — I45.10 COMPLETE RIGHT BUNDLE BRANCH BLOCK: ICD-10-CM

## 2025-08-07 DIAGNOSIS — Z95.810 ICD (IMPLANTABLE CARDIOVERTER-DEFIBRILLATOR), DUAL, IN SITU: ICD-10-CM

## 2025-08-07 DIAGNOSIS — Z95.2 S/P PULMONARY VALVE REPLACEMENT: ICD-10-CM

## 2025-08-07 DIAGNOSIS — Q25.6 PULMONARY ARTERY STENOSIS OF PERIPHERAL BRANCH AT OR BEYOND THE HILAR BIFURCATION: ICD-10-CM

## 2025-08-07 DIAGNOSIS — I37.1 NONRHEUMATIC PULMONARY VALVE INSUFFICIENCY: ICD-10-CM

## 2025-08-07 DIAGNOSIS — I47.20 VT (VENTRICULAR TACHYCARDIA): Primary | ICD-10-CM

## 2025-08-07 DIAGNOSIS — Q21.3 TETRALOGY OF FALLOT: ICD-10-CM

## 2025-08-07 DIAGNOSIS — Q24.9 CONGENITAL MALFORMATION OF HEART, UNSPECIFIED: ICD-10-CM

## 2025-08-07 DIAGNOSIS — I37.0 NONRHEUMATIC PULMONARY VALVE STENOSIS: Primary | ICD-10-CM

## 2025-08-07 DIAGNOSIS — Q24.9 ADULT CONGENITAL HEART DISEASE: ICD-10-CM

## 2025-08-07 DIAGNOSIS — I45.10 COMPLETE RIGHT BUNDLE BRANCH BLOCK: Primary | ICD-10-CM

## 2025-08-07 DIAGNOSIS — I47.20 VT (VENTRICULAR TACHYCARDIA): ICD-10-CM

## 2025-08-07 LAB
OHS CV CPX PATIENT HEIGHT IN: 75.04
OHS QRS DURATION: 184 MS
OHS QTC CALCULATION: 462 MS

## 2025-08-07 PROCEDURE — A9585 GADOBUTROL INJECTION: HCPCS | Performed by: PEDIATRICS

## 2025-08-07 PROCEDURE — 75565 CARD MRI VELOC FLOW MAPPING: CPT | Mod: 26,,, | Performed by: STUDENT IN AN ORGANIZED HEALTH CARE EDUCATION/TRAINING PROGRAM

## 2025-08-07 PROCEDURE — 75561 CARDIAC MRI FOR MORPH W/DYE: CPT | Mod: 26,,, | Performed by: STUDENT IN AN ORGANIZED HEALTH CARE EDUCATION/TRAINING PROGRAM

## 2025-08-07 PROCEDURE — 99214 OFFICE O/P EST MOD 30 MIN: CPT | Mod: S$GLB,,, | Performed by: PEDIATRICS

## 2025-08-07 PROCEDURE — 25500020 PHARM REV CODE 255: Performed by: PEDIATRICS

## 2025-08-07 PROCEDURE — 3074F SYST BP LT 130 MM HG: CPT | Mod: CPTII,S$GLB,, | Performed by: PEDIATRICS

## 2025-08-07 PROCEDURE — 3078F DIAST BP <80 MM HG: CPT | Mod: CPTII,S$GLB,, | Performed by: PEDIATRICS

## 2025-08-07 PROCEDURE — 75561 CARDIAC MRI FOR MORPH W/DYE: CPT | Mod: TC

## 2025-08-07 PROCEDURE — 93242 EXT ECG>48HR<7D RECORDING: CPT

## 2025-08-07 PROCEDURE — 99999 PR PBB SHADOW E&M-EST. PATIENT-LVL III: CPT | Mod: PBBFAC,,, | Performed by: PEDIATRICS

## 2025-08-07 PROCEDURE — 93010 ELECTROCARDIOGRAM REPORT: CPT | Mod: S$GLB,,, | Performed by: INTERNAL MEDICINE

## 2025-08-07 PROCEDURE — 3008F BODY MASS INDEX DOCD: CPT | Mod: CPTII,S$GLB,, | Performed by: PEDIATRICS

## 2025-08-07 PROCEDURE — 3044F HG A1C LEVEL LT 7.0%: CPT | Mod: CPTII,S$GLB,, | Performed by: PEDIATRICS

## 2025-08-07 RX ORDER — ERGOCALCIFEROL 1.25 MG/1
50000 CAPSULE ORAL
COMMUNITY

## 2025-08-07 RX ORDER — GADOBUTROL 604.72 MG/ML
11 INJECTION INTRAVENOUS
Status: COMPLETED | OUTPATIENT
Start: 2025-08-07 | End: 2025-08-07

## 2025-08-07 RX ADMIN — GADOBUTROL 11 ML: 604.72 INJECTION INTRAVENOUS at 10:08

## 2025-08-07 NOTE — CARE UPDATE
patient arrived for scheduled outpatient Cardiac MRI in NAD, patient identification verified X 2, allergies reviewed, patient assisted to MRI table without difficulty, cardiac defibrillator placed in MRI safe mode per [employer] MRI safe cardiac monitor and pulse ox. applied, heart rate 80, O2 sat. 96% on RA, NAD noted at this time, will continue to monitor patient throughout Cardiac MRI.

## 2025-08-07 NOTE — PROGRESS NOTES
08/07/2025    re:Jian Leiva  OB:2000    Ruslan Busch, FNP-C  [address] [facility]  [place] [address]    [facility]     Jian Leiva is a 25 y.o. male with the following diagnoses:  1.  Tetralogy of Fallot status post complete repair of 5 months of age   - status post multiple catheter based procedures for branch pulmonary artery stenosis   - now status post pulmonary valve replacement with a 27 mm Trifecta valve with branch pulmonary arterioplasty in 2013   - mild to moderate pulmonary valve stenosis and likely severe pulmonary valve insufficiency   - mildly to moderately reduced right ventricular function with likely mild enlargement   - mildly enlarged aortic root without aortic insufficiency   - no symptoms  2.  Dual chamber ICD implant 2018 secondary to nonsustained ventricular tachycardia that persisted on high-dose beta-blockers  3.  MRI today very suboptimal due to ICD artifact    To summarize, my recommendations are as follows:  1.  Continue current medications  2.  Healthy diet, regular low intensity exercise  3.  SBE prophylaxis is indicated before dental work.  He should practice good dental hygiene and see the dentist regularly.  4.  Return to clinic in 1 year with echo and EKG, EP visit, likely CPX testing  5.  Continue regular follow-up with congenital electrophysiology.  6.  Follow up MRI report from today    Discussion:  He clinically looks great, and he is asymptomatic.  However, his pulmonary valve does leak significantly.  He will need a new pulmonary valve in the future, and he should be a candidate for a catheter based valve. Unfortunately, it doesn't look like MRI is going to be useful to follow the RV so we will base this on symptoms and echo.    Interval history:  He denies any symptoms related to the cardiovascular system.  He denies chest pain, shortness of breath, dyspnea on exertion, cyanosis, edema, palpitations, syncope, and near-syncope.    He  "is now in nursing school.  He lives with his mother.    The review of systems is as noted above. It is otherwise negative for other symptoms related to the general, neurological, psychiatric, endocrine, gastrointestinal, genitourinary, respiratory, dermatologic, musculoskeletal, hematologic, and immunologic systems.    Past Medical History:   Diagnosis Date    Complete right bundle branch block     ICD (implantable cardioverter-defibrillator) in place     PDA (patent ductus arteriosus)     Pulmonary artery stenosis, branch, central     Pulmonary artery stenosis of peripheral branch at or beyond the hilar bifurcation     Pulmonary insufficiency     Pulmonary stenosis     Pulmonary valve replaced     PVC (premature ventricular contraction)     Right pulmonary artery stenosis     TOF (tetralogy of Fallot)     VT (ventricular tachycardia) 06/2018    Holter      Past Surgical History:   Procedure Laterality Date    CARDIAC CATHETERIZATION  2001    [facility]: severe tetralogy of FAllot with multiple levels of obstruction (infundibular, valvular, supravalvular), confluent pulmonary arteries, normal PA pressures with balance ventricular pressures. The cath was not able to be completed, however, due to hypercyanotic spell toward the end of the procedure, he was transferred to [facility]    CARDIAC CATHETERIZATION  01/02/2004    [facility]:TOF s/p repair;Branch pulmonary arteries- right ventricular pressures pre-stent placement are 73% of systemic and post-stent placement are 41% of systemic. RPA stent inflated to 12mm in diameter with no residual stenosis. Left pulmonary artery stent inflated to 15mm, however, has a persistent residual stenosis at the origin of the left pulmonary artery    CARDIAC CATHETERIZATION  05/29/2007    [facility]:TOF, repaired;Bilateral branch PA stenosis with large "biliary stents";RPA dilated from 16mm to 18mm; LPA dilated from 6mm to 13mm; no residual branch PA gradients;MInimal RV outflow " gradient, 10-12mmHg;Mild RV enlargement, normal RV function;No shunts;No aortopulmonary collaterals;Closing RVp/LVp = 0.4;Mild tricuspid valve insufficiency    CARDIAC CATHETERIZATION  10/30/2009    [facility]:Repaired tetralogy of Fallot (transannular patch technique with bilateral branch pulmonary stenosis);Mildly under expanded stents, re-expanded to 16mm bilaterally. Post-dilation gradient 6-9mmHg as a result of free pulmonary valve insufficiency;Free pulmonary valve insufficiency, mild RVE;Normal right and left ventricular function;No shunts identified;Left aorta;Normal Laura;No aortopulmo     CARDIAC CATHETERIZATION  07/19/2011    [facility]:TOF;Right and left branch pulmonary artery stenosis;s/p stent placement of right and left branch pulmonary arteries, P308;s/p redilation of right and left PA branches and stents with 12mm balloons. Gradient across branch PAs was roughly 20-25mmHg before and 15-20mmHg after. RV pressure remains about 50% systemic;     CARDIAC CATHETERIZATION  08/13/2012    [facility]:TOF;Right and left branch pulmonary stenosis;Status post stent placement in the right and left branch pulmonary arteries;Status post re-dilation of stent 2007 & 2011. Due to PA positioning (branch PA stents allocated adjacent to each other), stent dilation cannot be performed. Needs augmentation of branch vessels surgically, but possibly valve replacement to be scheduduled at same time    CARDIAC DEFIBRILLATOR PLACEMENT  07/18/2018    [facility]: ICD DYNAGEN EL  D152: Serial No. [id number]; LEAD RELIANCE DF4 0293 64CM: Serial No. [id number]; LEAD INGEVITY IS-1 MRI 7741 52CM: Serial No. [id number];     CARDIAC MRI  03/30/2010    [facility]:Normal LV volume with LVEF 40%. Global hypokinesis which may be secondary to anesthesia;Increased RV volume with RVEF 27%;Unrestricted PI with regurgitation;Good pulmonary blood flow by MRA with gadolinium    CARDIAC MRI  11/25/2014    [facility]:Normal left ventricular volumes with LVEF 55%;Increased right  ventricular volumes with RVEF 39%;UQINTIN;Pulmonary prosthetic valve with mild PI and peak velocity of 3.7m/sec;RVEDV: 143 cc/m2 for BSA    CARDIAC MRI  12/08/2015    [facility]: RVEDV 138cc/m2; PV 3m/s; mild PI; prox RPA stenosis    CARDIAC SURGERY  2001    [facility]: Complete repair of tetralogy of Fallot with transannular patch;Ligation of PDA    CARDIAC SURGERY  06/14/2013    [facility]:Pulmonary valve replacement using 27mm Trifecta bovine pericardial valve;Bilateral patch pulmonary arterioplasty using bovine pericardium. Incisions made into the left and right branch PAs, anterior aspects of the stents were resected, and PA branches were patched with bovine pericardial patch with running suture    WISDOM TOOTH EXTRACTION       Family History   Problem Relation Name Age of Onset    Hypertension Mother      No Known Problems Father      No Known Problems Sister      No Known Problems Brother      No Known Problems Brother      No Known Problems Sister      No Known Problems Sister      No Known Problems Maternal Grandfather      Congenital heart disease Neg Hx      Early death Neg Hx      Hyperlipidemia Neg Hx      Heart attacks under age 50 Neg Hx      Arrhythmia Neg Hx       Social History     Socioeconomic History    Marital status: Single   Tobacco Use    Smoking status: Never    Smokeless tobacco: Never   Social History Narrative    Jian live with parents. He works at [employer] and goes to school for [employer]. No smoke exposure.     Current Outpatient Medications on File Prior to Visit   Medication Sig Dispense Refill    aspirin 81 MG Chew Take 81 mg by mouth once daily.      ergocalciferol (ERGOCALCIFEROL) 50,000 unit Cap Take 50,000 Units by mouth every 7 days.      fluticasone propionate (FLONASE) 50 mcg/actuation nasal spray 1 spray by Nasal route daily as needed.      metoprolol succinate (TOPROL-XL) 50 MG 24 hr tablet Take 1 tablet (50 mg total) by mouth 2 (two) times daily. 60 tablet 2  "    Current Facility-Administered Medications on File Prior to Visit   Medication Dose Route Frequency Provider Last Rate Last Admin    [COMPLETED] gadobutroL (GADAVIST) injection 11 mL  11 mL Intravenous ONCE PRN Sukhi Gibson MD   11 mL at 08/07/25 1012     Review of patient's allergies indicates:   Allergen Reactions    Kaopectate (attapulgite)      Vitals:    08/07/25 1132   BP: 121/65   BP Location: Right arm   Patient Position: Sitting   Pulse: 62   SpO2: 100%   Weight: 96.2 kg (212 lb 1.3 oz)   Height: 6' 3.04" (1.906 m)     In general, he is a very healthy-appearing nondysmorphic male in no apparent distress.  The eyes, nares, and oropharynx are clear.  Eyelids and conjunctiva are normal without drainage or erythema.  Pupils equal and round bilaterally.  The head is normocephalic and atraumatic.  The neck is supple without jugular venous distention or thyroid enlargement.  The lungs are clear to auscultation bilaterally.  There is a well healed sternotomy.  First heart sound normal, second fixed and split.  2/6 systolic ejection murmur, 2/4 diastolic murmur.  The abdominal exam is benign without hepatosplenomegaly, tenderness, or distention.  Pulses are normal in all 4 extremities with brisk capillary refill and no clubbing, cyanosis, or edema.  No rashes are noted.     I personally reviewed the following tests performed today and my interpretation follows:  EKG Atrial-paced rhythm with prolonged AV conduction   Right bundle branch block , plus right ventricular hypertrophy   Right superior axis deviation   Abnormal ECG   QRS 180ms    Holter 2023:  Sinus rhythm with intraventricular conduction delay predominates.  Occasional atrial and ventricular pacing noted.  Normal HR range.  Patient-triggered events (2) correlate to sinus rhythm.  No significant ectopy burden.    Echo April 2023:  S/P Repair Tetralogy of Fallot (transannular patch), Branch PA stenosis - S/P bilateral stents. Pulmonary valve " replacement with 27 mm Trifecta bovine pericardial valve and bilateral patch pulmonary arterioplasty, June 2013.  Moderate stenosis across the bioprosthetic pulmonary valve PG 47(28), 52 (28) mmHg. Free pulmonary insufficiency. Dyskinetic ventricular septum, but good LVPW motion on PLAX virw? LV apical view not as good Pacemaker lead seen crossing the TV and inserting into the RV with only trivial tricuspid insufficiency. IVSDd 1.14 cm RVSP 42 mmHg with mildly reduced function Mild RA & RV dilation RVIDd 3.5 cm with mild hypertrophy LVIDd 5.2 cm AV PG 21(10) mmHg from LVOT? From Asc. Ao PG 2 mmHg No VSD shunt noted TAPSE 2.0 cm LV lateral tissue doppler mildly decreased LA Volume 17 ml/m2 Unable to visualize branch PA's. Coronaries not visualized on today's exam. Clinical Correlation Suggested Complete IE D. aorta PG 5 mmHg     CPX today:    The ECG portion of this study is negative for myocardial ischemia.    The patient's exercise capacity was average.    There were no arrhythmias during stress.    The test was stopped because the patient experienced fatigue.    Mild functional impairment associated with a normal breathing reserve, reduced oxygen saturation with exercise, suboptimal effort, and a normal AT. These findings are indicative of functional impairment secondary to deconditioning.    There was no ST segment deviation noted during stress.    A cardiac MRI performed 2017 revealed:  Conclusion:     1. Mildly increased right ventricular volumes. (RVEDV 120cc/m2 for BSA, RVESV 73 cc/m2 for BSA).  RVEF 39%.   2. Increase LV systolic left ventricular volume with LVEF 48 %.   3. Bioprosthetic pulmonary valve with a peak systolic velocity of 3.8 m/sec and PI regurgitation fraction of 10% and regurgitation volume of 9 cc consistent with mild PI   4. Distal PA?s appear patent distally.   5. Compared to prior MRI in 2015,the pulmonary valve systolic velocity has increased.    Thank you for referring this patient to  our clinic.  Please call with any questions.    Sincerely,     Sukhi Gibson MD  Pediatric Cardiology  Adult Congenital Heart Disease  Pediatric Heart Failure and Transplantation  [facility] [address] [place]  [address]  [phone]

## 2025-08-07 NOTE — NURSING
Cardiac MRI complete at this time, patient tolerated MRI, heart rate 80, O2 sat. 98% on RA, cardiac defibrillator placed in normal operating mode per [facility] Rep., patient discharged in NAD.

## 2025-08-29 DIAGNOSIS — I49.3 PVCS (PREMATURE VENTRICULAR CONTRACTIONS): ICD-10-CM

## 2025-08-29 DIAGNOSIS — I45.10 COMPLETE RIGHT BUNDLE BRANCH BLOCK (RBBB): ICD-10-CM

## 2025-08-29 DIAGNOSIS — Z95.810 ICD (IMPLANTABLE CARDIOVERTER-DEFIBRILLATOR) IN PLACE: ICD-10-CM

## 2025-08-29 DIAGNOSIS — I47.20 VT (VENTRICULAR TACHYCARDIA): ICD-10-CM

## 2025-08-29 RX ORDER — METOPROLOL SUCCINATE 50 MG/1
50 TABLET, EXTENDED RELEASE ORAL 2 TIMES DAILY
Qty: 180 TABLET | Refills: 3 | Status: SHIPPED | OUTPATIENT
Start: 2025-08-29